# Patient Record
Sex: MALE | Race: WHITE | NOT HISPANIC OR LATINO | Employment: OTHER | URBAN - METROPOLITAN AREA
[De-identification: names, ages, dates, MRNs, and addresses within clinical notes are randomized per-mention and may not be internally consistent; named-entity substitution may affect disease eponyms.]

---

## 2017-09-27 ENCOUNTER — HOSPITAL ENCOUNTER (OUTPATIENT)
Facility: MEDICAL CENTER | Age: 62
End: 2017-09-27
Payer: COMMERCIAL

## 2017-09-27 ENCOUNTER — EH NON-PROVIDER (OUTPATIENT)
Dept: OCCUPATIONAL MEDICINE | Facility: CLINIC | Age: 62
End: 2017-09-27

## 2017-09-27 ENCOUNTER — HOSPITAL ENCOUNTER (OUTPATIENT)
Facility: MEDICAL CENTER | Age: 62
End: 2017-09-27
Attending: PREVENTIVE MEDICINE
Payer: COMMERCIAL

## 2017-09-27 DIAGNOSIS — Z02.89 ENCOUNTER FOR OCCUPATIONAL HEALTH EXAMINATION: ICD-10-CM

## 2017-09-27 LAB
BDY FAT % MEASURED: 23.9 %
BP DIAS: 68 MMHG
BP SYS: 138 MMHG
CHOLEST SERPL-MCNC: 201 MG/DL (ref 100–199)
DIABETES HTDIA: NO
EVENT NAME HTEVT: NORMAL
FASTING HTFAS: YES
GLUCOSE SERPL-MCNC: 105 MG/DL (ref 65–99)
HDLC SERPL-MCNC: 37 MG/DL
HYPERTENSION HTHYP: NO
LDLC SERPL CALC-MCNC: 128 MG/DL
SCREENING LOC CITY HTCIT: NORMAL
SCREENING LOC STATE HTSTA: NORMAL
SCREENING LOCATION HTLOC: NORMAL
SMOKING HTSMO: NO
SUBSCRIBER ID HTSID: NORMAL
TRIGL SERPL-MCNC: 179 MG/DL (ref 0–149)

## 2017-09-27 PROCEDURE — 86480 TB TEST CELL IMMUN MEASURE: CPT | Performed by: PREVENTIVE MEDICINE

## 2017-09-27 PROCEDURE — S5190 WELLNESS ASSESSMENT BY NONPH: HCPCS

## 2017-09-27 PROCEDURE — 80061 LIPID PANEL: CPT

## 2017-09-27 PROCEDURE — 82947 ASSAY GLUCOSE BLOOD QUANT: CPT

## 2017-09-29 LAB
M TB TUBERC IFN-G BLD QL: NEGATIVE
M TB TUBERC IFN-G/MITOGEN IGNF BLD: -0
M TB TUBERC IGNF/MITOGEN IGNF CONTROL: 62.25 [IU]/ML
MITOGEN IGNF BCKGRD COR BLD-ACNC: 0.03 [IU]/ML

## 2017-10-10 ENCOUNTER — IMMUNIZATION (OUTPATIENT)
Dept: OCCUPATIONAL MEDICINE | Facility: CLINIC | Age: 62
End: 2017-10-10

## 2017-10-10 DIAGNOSIS — Z23 NEED FOR VACCINATION: ICD-10-CM

## 2017-10-10 PROCEDURE — 90686 IIV4 VACC NO PRSV 0.5 ML IM: CPT | Performed by: PREVENTIVE MEDICINE

## 2018-01-18 ENCOUNTER — EH NON-PROVIDER (OUTPATIENT)
Dept: OCCUPATIONAL MEDICINE | Facility: CLINIC | Age: 63
End: 2018-01-18

## 2018-03-06 ENCOUNTER — HOSPITAL ENCOUNTER (OUTPATIENT)
Dept: LAB | Facility: MEDICAL CENTER | Age: 63
End: 2018-03-06
Attending: FAMILY MEDICINE
Payer: COMMERCIAL

## 2018-03-06 ENCOUNTER — HOSPITAL ENCOUNTER (OUTPATIENT)
Dept: RADIOLOGY | Facility: MEDICAL CENTER | Age: 63
End: 2018-03-06
Attending: FAMILY MEDICINE
Payer: COMMERCIAL

## 2018-03-06 DIAGNOSIS — G43.119 CLASSICAL MIGRAINE WITH INTRACTABLE MIGRAINE: ICD-10-CM

## 2018-03-06 LAB
BUN SERPL-MCNC: 21 MG/DL (ref 8–22)
CREAT SERPL-MCNC: 1.35 MG/DL (ref 0.5–1.4)

## 2018-03-06 PROCEDURE — A9577 INJ MULTIHANCE: HCPCS | Performed by: FAMILY MEDICINE

## 2018-03-06 PROCEDURE — 700117 HCHG RX CONTRAST REV CODE 255: Performed by: FAMILY MEDICINE

## 2018-03-06 PROCEDURE — 70553 MRI BRAIN STEM W/O & W/DYE: CPT

## 2018-03-06 PROCEDURE — 36415 COLL VENOUS BLD VENIPUNCTURE: CPT

## 2018-03-06 PROCEDURE — 84520 ASSAY OF UREA NITROGEN: CPT

## 2018-03-06 PROCEDURE — 82565 ASSAY OF CREATININE: CPT

## 2018-03-06 RX ADMIN — GADOBENATE DIMEGLUMINE 10 ML: 529 INJECTION, SOLUTION INTRAVENOUS at 18:01

## 2018-06-27 ENCOUNTER — APPOINTMENT (OUTPATIENT)
Dept: URGENT CARE | Facility: CLINIC | Age: 63
End: 2018-06-27

## 2018-06-27 ENCOUNTER — APPOINTMENT (OUTPATIENT)
Dept: RADIOLOGY | Facility: IMAGING CENTER | Age: 63
End: 2018-06-27
Attending: NURSE PRACTITIONER
Payer: COMMERCIAL

## 2018-06-27 ENCOUNTER — OCCUPATIONAL MEDICINE (OUTPATIENT)
Dept: URGENT CARE | Facility: CLINIC | Age: 63
End: 2018-06-27
Payer: COMMERCIAL

## 2018-06-27 VITALS
HEIGHT: 72 IN | WEIGHT: 217 LBS | BODY MASS INDEX: 29.39 KG/M2 | OXYGEN SATURATION: 98 % | DIASTOLIC BLOOD PRESSURE: 62 MMHG | HEART RATE: 77 BPM | SYSTOLIC BLOOD PRESSURE: 142 MMHG | TEMPERATURE: 98.3 F

## 2018-06-27 DIAGNOSIS — S86.911A KNEE STRAIN, RIGHT, INITIAL ENCOUNTER: ICD-10-CM

## 2018-06-27 DIAGNOSIS — M25.461 EFFUSION OF RIGHT KNEE: ICD-10-CM

## 2018-06-27 PROCEDURE — 73562 X-RAY EXAM OF KNEE 3: CPT | Mod: 26,RT,29 | Performed by: NURSE PRACTITIONER

## 2018-06-27 PROCEDURE — 99213 OFFICE O/P EST LOW 20 MIN: CPT | Mod: 29 | Performed by: NURSE PRACTITIONER

## 2018-06-27 ASSESSMENT — ENCOUNTER SYMPTOMS
JOINT SWELLING: 1
SHORTNESS OF BREATH: 0
ARTHRALGIAS: 0
CHILLS: 0
DIZZINESS: 0
EYE PAIN: 0
FEVER: 0
NAUSEA: 0
VOMITING: 0
MYALGIAS: 0
WEAKNESS: 0
SORE THROAT: 0
NUMBNESS: 0

## 2018-06-27 NOTE — LETTER
[place] [id number] [facility] [address] [place] [address]  Phone:  [phone] - Fax:  [phone]   [facility] Progress Report and Disability Certification  Date of Service: 6/27/2018   No Show:  No  Date / Time of Next Visit: 7/2/2018 @ 5pm   Claim Information   Patient Name: Jimmy Hirsch  Claim Number:     Employer: [employer]  Date of Injury: 6/26/2018     Insurer / TPA: Workers Choice  ID / SSN:     Occupation: Clinial Couner  Diagnosis: Diagnoses of Knee strain, right, initial encounter and Effusion of right knee were pertinent to this visit.    Medical Information   Related to Industrial Injury? Yes    Subjective Complaints:  DOI: 6/26/18. Patient presents to clinic with complaitns of right knee injury. He stepped out of his vehicle when he turned to walk he felt a pop of his right knee. He immideatly had pain and discomfort when he began walking. He also had notable swelling. He attempted to ice, rest, elevated, and aleve with minimal relief in symptoms. He is having 10/10 pain of the knee. Previously He sprained right knee in December playing basketball. He went to the [employer] [facility] and had a cortisone shot at that time with significant relief in symptoms. Since then all Pain was resolved as of yesterdays incident. He denies second job. Denies surgery.    Objective Findings:  Right knee with decreased range of motion, swelling, tenderness to the medial joint line. Gait antalgic.     Pre-Existing Condition(s):     Assessment:   Initial Visit    Status: Additional Care Required  Permanent Disability:No    Plan:      Diagnostics: X-ray    Comments:  No acute fracture or dislocation.  Mild tricompartmental joint osteoarthritis.  Degenerative change of the medial meniscus with calcifications.  Moderate knee joint effusion.    Disability Information   Status: Released to Full Duty    From:  6/27/2018  Through: 7/2/2018 Restrictions are:     Physical Restrictions    Sitting:    Standing:    Stooping:    Bending:      Squatting:    Walking:    Climbing:    Pushing:      Pulling:    Other:    Reaching Above Shoulder (L):   Reaching Above Shoulder (R):       Reaching Below Shoulder (L):    Reaching Below Shoulder (R):      Not to exceed Weight Limits   Carrying(hrs):   Weight Limit(lb):   Lifting(hrs):   Weight  Limit(lb):     Comments: Advised Relative rest, ice, nsaid prn. Elevation and compression prn swelling. Patient provided a knee brace. Patient declined crutches as he has crutches at home.Patient declining temporary work restrictions will be released to full duty. Will have patient follow up with occupational health for further evaluation and management.      Repetitive Actions   Hands: i.e. Fine Manipulations from Grasping:     Feet: i.e. Operating Foot Controls:     Driving / Operate Machinery:     Physician Name: NICA Scott Physician Signature: ADDIE Rizo e-Signature: Dr. Jarrod Jordan, Medical Director   Clinic Name / Location: [address] [address] Clinic Phone Number: Dept: [phone]   Appointment Time: 1:45 Pm Visit Start Time: 2:00 PM   Check-In Time:  1:45 Pm Visit Discharge Time:  3:05pm   Original-Treating Physician or Chiropractor    Page 2-Insurer/TPA    Page 3-Employer    Page 4-Employee

## 2018-06-27 NOTE — PROGRESS NOTES
Subjective:     Jimmy Hirsch is a 63 y.o. male who presents for Knee Injury (yesterday Right knee tweeked and hurt pop )    DOI: 6/26/18. Patient presents to clinic with complaitns of right knee injury. He stepped out of his vehicle when he turned to walk he felt a pop of his right knee. He immideatly had pain and discomfort when he began walking. He also had notable swelling. He attempted to ice, rest, elevated, and aleve with minimal relief in symptoms. He is having 10/10 pain of the knee. Previously He sprained right knee in December playing basketball. He went to the [facility] and had a cortisone shot at that time with significant relief in symptoms. Since then all Pain was resolved as of yesterdays incident. He denies second job. Denies surgery.   Knee Injury   This is a new problem. The current episode started yesterday. The problem occurs constantly. The problem has been unchanged. Associated symptoms include joint swelling (right knee). Pertinent negatives include no arthralgias, chest pain, chills, fever, myalgias, nausea, numbness, rash, sore throat, vomiting or weakness. The symptoms are aggravated by walking. He has tried NSAIDs, ice and rest for the symptoms. The treatment provided no relief.     Past Medical History:   Diagnosis Date   • Unspecified disorder of thyroid      Past Surgical History:   Procedure Laterality Date   • KNEE ARTHROSCOPY  5/8/2012    Performed by JACQUELINE MOSER at [facility] ORS   • MEDIAL MENISCECTOMY  5/8/2012    Performed by JACQUELINE MOSER at [facility] OR[facility]   • CONDYLOMA ABLATION LASER  2/18/2010    Performed by RAUL HINES at SUR[facility] ORS   • WRIST ORIF  6/1984   • APPENDECTOMY  6/1978   • OTHER       Social History     Social History   • Marital status:      Spouse name: N/A   • Number of children: N/A   • Years of education: N/A     Occupational History   • Not on file.     Social History Main Topics   • Smoking status:  Former Smoker     Types: Cigars   • Smokeless tobacco: Never Used      Comment: 3-4 cigars per day   • Alcohol use No   • Drug use: No   • Sexual activity: Not on file     Other Topics Concern   • Not on file     Social History Narrative   • No narrative on file    History reviewed. No pertinent family history. Review of Systems   Constitutional: Negative for chills and fever.   HENT: Negative for sore throat.    Eyes: Negative for pain.   Respiratory: Negative for shortness of breath.    Cardiovascular: Negative for chest pain.   Gastrointestinal: Negative for nausea and vomiting.   Genitourinary: Negative for hematuria.   Musculoskeletal: Positive for joint pain and joint swelling (right knee). Negative for arthralgias and myalgias.   Skin: Negative for rash.   Neurological: Negative for dizziness, weakness and numbness.     Allergies   Allergen Reactions   • Nkda [No Known Drug Allergy]       Objective:   /62   Pulse 77   Temp 36.8 °C (98.3 °F)   Ht 1.829 m (6')   Wt 98.4 kg (217 lb)   SpO2 98%   BMI 29.43 kg/m²   Physical Exam   Constitutional: He is oriented to person, place, and time. He appears well-developed and well-nourished. No distress.   HENT:   Head: Normocephalic and atraumatic.   Eyes: Conjunctivae and EOM are normal. Pupils are equal, round, and reactive to light.   Cardiovascular: Normal rate and regular rhythm.    No murmur heard.  Pulmonary/Chest: Effort normal and breath sounds normal. No respiratory distress.   Abdominal: Soft. He exhibits no distension. There is no tenderness.   Musculoskeletal:        Right knee: He exhibits decreased range of motion, swelling and bony tenderness. He exhibits no LCL laxity, normal patellar mobility, normal meniscus and no MCL laxity. Tenderness found. Medial joint line tenderness noted.        Legs:       Neurological: He is alert and oriented to person, place, and time. He has normal reflexes. No sensory deficit.   Skin: Skin is warm and dry.      Psychiatric: He has a normal mood and affect.        Right knee with decreased range of motion, swelling, tenderness to the medial joint line. Gait antalgic.  Assessment/Plan:   Assessment    1. Knee strain, right, initial encounter  2. Effusion of right knee  - DX-KNEE 3 VIEWS RIGHT; Future    Xray results  No acute fracture or dislocation.  Mild tricompartmental joint osteoarthritis.  Degenerative change of the medial meniscus with calcifications.  Moderate knee joint effusion.    Advised Relative rest, ice, nsaid prn. Elevation and compression prn swelling. Patient provided a knee brace. Patient declined crutches as he has crutches at home.Patient declining temporary work restrictions will be released to full duty. Will have patient follow up with occupational health for further evaluation and management of right knee strain.      Differential diagnosis, natural history, supportive care, and indications for immediate follow-up discussed.

## 2018-06-27 NOTE — LETTER
EMPLOYEE’S CLAIM FOR COMPENSATION/ REPORT OF INITIAL TREATMENT  FORM C-4    EMPLOYEE’S CLAIM - PROVIDE ALL INFORMATION REQUESTED   First Name  Jimmy Last Name  Torrey Birthdate                    1955                Sex  male Claim Number   Home Address  [address] Age  63 y.o. 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  Telephone  [phone]    Employer Address  [address]  [address]    Date of Injury  6/26/2018               Hour of Injury  2:45 PM Date Employer Notified  6/27/2018 Last Day of Work after Injury or Occupational Disease  6/26/2018 Supervisor to Whom Injury Reported  Lisafrank   Address or Location of Accident (if applicable)  [[address] [place] [address]]   What were you doing at the time of accident? (if applicable)  Getting out of company vehicle    How did this injury or occupational disease occur? (Be specific an answer in detail. Use additional sheet if necessary)  After getting out of vehicle, I turned to walk to  specimens and heard/ felt a pop in   my knee   If you believe that you have an occupational disease, when did you first have knowledge of the disability and it relationship to your employment?  na Witnesses to the Accident  na      Nature of Injury or Occupational Disease  Sprain  Part(s) of Body Injured or Affected  Knee (R), ,     I certify that the above is true and correct to the best of my knowledge and that I have provided this information in order to obtain the benefits of [facility]  Industrial Insurance and Occupational Diseases Acts [place] inclusive or Chapter 617 of [place]).  I hereby authorize any physician, chiropractor, surgeon, practitioner, or other person, any hospital, including [facility] or [facility], any medical service organization, any insurance company, or other institution or organization to release to each other, any medical or other information, including benefits paid or payable, pertinent to this injury or disease, except information relative to diagnosis, treatment and/or counseling for AIDS, psychological conditions, alcohol or controlled substances, for which I must give specific authorization.  A Photostat of this authorization shall be as valid as the original.     Date   Place   Employee’s Signature   THIS REPORT MUST BE COMPLETED AND MAILED WITHIN 3 WORKING DAYS OF TREATMENT   Place  [facility]  Name of Facility  [facility]   Date  6/27/2018 Diagnosis  (S86.911A) Knee strain, right, initial encounter  (M25.461) Effusion of right knee Is there evidence the injured employee was under the influence of alcohol and/or another controlled substance at the time of accident?   Hour  2:00 PM Description of Injury or Disease  Diagnoses of Knee strain, right, initial encounter and Effusion of right knee were pertinent to this visit. No   Treatment  Advised Relative rest, ice, nsaid prn. Elevation and compression prn swelling. Patient provided a knee brace. Patient declined crutches as he has crutches at home. Advised weight-bearing as tolerated. Patient declining temporary work restrictions will be released to full duty. Will have patient follow up with occupational health for further evaluation and management of right knee strain.  Have you advised the patient to remain off work five days or more? No   X-Ray Findings  Positive  Comments:Moderate knee joint effusion   If Yes   From Date  To Date      From information given by the  "employee, together with medical evidence, can you directly connect this injury or occupational disease as job incurred?    Comments:to be determined If No Full Duty  Yes Modified Duty      Is additional medical care by a physician indicated?  Yes If Modified Duty, Specify any Limitations / Restrictions      Do you know of any previous injury or disease contributing to this condition or occupational disease?                            Yes  Comments:Previous right knee sprain   Date  6/27/2018 Print Doctor’s Name NICA Scott I certify the employer’s copy of  this form was mailed on:   Address  [address] [place] [address] [address]     [facility] Zip  [address]    Provider’s Tax ID Number  [id number] Telephone  Dept: [phone]        marbin-ADDIE Chaudhry   e-Signature: Dr. Jarrod Jordan, Medical Director Degree  APRN        ORIGINAL-TREATING PHYSICIAN OR CHIROPRACTOR    PAGE 2-INSURER/TPA    PAGE 3-EMPLOYER    PAGE 4-EMPLOYEE             Form C-4 (rev10/07)              BRIEF DESCRIPTION OF RIGHTS AND BENEFITS  (Pursuant to [place]RS 616C.050)    Notice of Injury or Occupational Disease (Incident Report Form C-1): If an injury or occupational disease (OD) arises out of and in the  course of employment, you must provide written notice to your employer as soon as practicable, but no later than 7 days after the accident or  OD. Your employer shall maintain a sufficient supply of the required forms.    Claim for Compensation (Form C-4): If medical treatment is sought, the form C-4 is available at the place of initial treatment. A completed  \"Claim for Compensation\" (Form C-4) must be filed within 90 days after an accident or OD. The treating physician or chiropractor must,  within 3 working days after treatment, complete and mail to the employer, the employer's insurer and third-party , the Claim for  Compensation.    Medical Treatment: If you require medical treatment " for your on-the-job injury or OD, you may be required to select a physician or  chiropractor from a list provided by your workers’ compensation insurer, if it has contracted with an Organization for Managed Care (MCO) or  Preferred Provider Organization (PPO) or providers of health care. If your employer has not entered into a contract with an MCO or PPO, you  may select a physician or chiropractor from the Panel of Physicians and Chiropractors. Any medical costs related to your industrial injury or  OD will be paid by your insurer.    Temporary Total Disability (TTD): If your doctor has certified that you are unable to work for a period of at least 5 consecutive days, or 5  cumulative days in a 20-day period, or places restrictions on you that your employer does not accommodate, you may be entitled to TTD  compensation.    Temporary Partial Disability (TPD): If the wage you receive upon reemployment is less than the compensation for TTD to which you are  entitled, the insurer may be required to pay you TPD compensation to make up the difference. TPD can only be paid for a maximum of 24  months.    Permanent Partial Disability (PPD): When your medical condition is stable and there is an indication of a PPD as a result of your injury or  OD, within 30 days, your insurer must arrange for an evaluation by a rating physician or chiropractor to determine the degree of your PPD. The  amount of your PPD award depends on the date of injury, the results of the PPD evaluation and your age and wage.    Permanent Total Disability (PTD): If you are medically certified by a treating physician or chiropractor as permanently and totally disabled  and have been granted a PTD status by your insurer, you are entitled to receive monthly benefits not to exceed 66 2/3% of your average  monthly wage. The amount of your PTD payments is subject to reduction if you previously received a PPD award.    Vocational Rehabilitation Services: You  may be eligible for vocational rehabilitation services if you are unable to return to the job due to a  permanent physical impairment or permanent restrictions as a result of your injury or occupational disease.    Transportation and Per [employer] Reimbursement: You may be eligible for travel expenses and per cisco associated with medical treatment.    Reopening: You may be able to reopen your claim if your condition worsens after claim closure.    Appeal Process: If you disagree with a written determination issued by the insurer or the insurer does not respond to your request, you may  appeal to the Department of Administration, , by following the instructions contained in your determination letter. You must  appeal the determination within 70 days from the date of the determination letter at [address], [address], [place], [place]  [address], or [address], [place], [place] [address]. If you disagree with the  decision, you may appeal to the  Department of Administration, . You must file your appeal within 30 days from the date of the  decision  letter at [address], [address], [place], [place] [address], or [address], [address], [place], [place] [address]. If you  disagree with a decision of an , you may file a petition for judicial review with the District Court. You must do so within 30  days of the Appeal Officer’s decision. You may be represented by an  at your own expense or you may contact the [facility] for possible  representation.    [place]  for Injured Workers (NAIW): If you disagree with a  decision, you may request that NAIW represent you  without charge at an  Hearing. For information regarding denial of benefits, you may contact the [facility] at: [address], [address], [place] [address], [phone]) [phone], or [address], [place] [address], [phone]    To File a Complaint with the Division: If you wish to file a complaint with the  of the Division of Industrial Relations (DIR),  please contact the Workers’ Compensation Section, [address], [address], [place], [place] [address], telephone (442) [phone], or  [address], [address], [place], [place] [address], telephone (586) [phone].    For assistance with Workers’ Compensation Issues: you may contact the Office of the Governor Consumer Health Assistance, [address] [place] [address], [address], [place], [place] [address], Toll Free 1-716.421.6426, Web site: http://govcha.[facility].nv.us, E-mail  [email or web address].                                                                                                                                                                                                                                   __________________________________________________________________                                                                   _________________                Employee Name / Signature                                                                                                                                                       Date                                                                                                                                                                                                     D-2 (rev. 10/07)

## 2018-07-02 ENCOUNTER — OCCUPATIONAL MEDICINE (OUTPATIENT)
Dept: URGENT CARE | Facility: CLINIC | Age: 63
End: 2018-07-02
Payer: COMMERCIAL

## 2018-07-02 VITALS
BODY MASS INDEX: 29.39 KG/M2 | HEIGHT: 72 IN | RESPIRATION RATE: 16 BRPM | HEART RATE: 54 BPM | SYSTOLIC BLOOD PRESSURE: 138 MMHG | TEMPERATURE: 98.1 F | WEIGHT: 217 LBS | DIASTOLIC BLOOD PRESSURE: 70 MMHG

## 2018-07-02 DIAGNOSIS — S86.911A STRAIN OF RIGHT KNEE, INITIAL ENCOUNTER: ICD-10-CM

## 2018-07-02 PROCEDURE — 99213 OFFICE O/P EST LOW 20 MIN: CPT | Mod: 29 | Performed by: NURSE PRACTITIONER

## 2018-07-02 ASSESSMENT — ENCOUNTER SYMPTOMS
CONSTITUTIONAL NEGATIVE: 1
CARDIOVASCULAR NEGATIVE: 1
GASTROINTESTINAL NEGATIVE: 1
PSYCHIATRIC NEGATIVE: 1
RESPIRATORY NEGATIVE: 1
NEUROLOGICAL NEGATIVE: 1

## 2018-07-02 NOTE — LETTER
[facility]  [address] [facility] [place] [address]  Phone:  [phone] - Fax:  [phone]   [facility] Progress Report and Disability Certification  Date of Service: 7/2/2018   No Show:  No  Date / Time of Next Visit:  7/9/2018 @ 4:40 pm   Claim Information   Patient Name: Jimmy Hirsch  Claim Number:     Employer: [employer]  Date of Injury: 6/26/2018     Insurer / TPA: Workers Choice  ID / SSN:     Occupation: Clinial Couner  Diagnosis: The encounter diagnosis was Strain of right knee, initial encounter.    Medical Information   Related to Industrial Injury? Yes  Comments:Difficult to fully correlate    Subjective Complaints:  DOI: 6/26/18. Patient presents to clinic with complaitns of right knee injury. He stepped out of his vehicle when he turned to walk he felt a pop of his right knee. He immediately had pain and discomfort when he began walking. He also had notable swelling. Admits to previous sprain of right knee in December playing basketball. He went to the [facility] [facility] and had a cortisone shot at that time with significant relief in symptoms. Since then all pain was resolved as of recent incident. He denies second job. Denies surgery. This is his second visit and reports improvement. Pain is currently 0/10 but can increase to 3/10 with ambulation. Overall, reports approx 75%. He is on full duty and tolerating well. He has been using a brace PRN, icing, resting, and using Aleve PRN for pain relief.    Objective Findings: A/Ox4. NAD. Right knee with mild swelling to medial aspect. Knee is non tender, has FROM. Strength 5/5. Distal N/V intact.    Pre-Existing Condition(s):     Assessment:   Condition Improved    Status: Additional Care Required  Permanent Disability:No    Plan: Medication  Comments:OTC NSAIDS, brace PRN, RICE, full duty, RTC in one week for re-eval     Diagnostics:      Comments:       Disability Information   Status: Released to  Full Duty    From:     Through:   Restrictions are:     Physical Restrictions   Sitting:    Standing:    Stooping:    Bending:      Squatting:    Walking:    Climbing:    Pushing:      Pulling:    Other:    Reaching Above Shoulder (L):   Reaching Above Shoulder (R):       Reaching Below Shoulder (L):    Reaching Below Shoulder (R):      Not to exceed Weight Limits   Carrying(hrs):   Weight Limit(lb):   Lifting(hrs):   Weight  Limit(lb):     Comments:      Repetitive Actions   Hands: i.e. Fine Manipulations from Grasping:     Feet: i.e. Operating Foot Controls:     Driving / Operate Machinery:     Physician Name: NICA Feliciano Physician Signature: JAZZY Preciado e-Signature: Dr. Jarrod Jordan, Medical Director   Clinic Name / Location: [address] [address] Clinic Phone Number: Dept: [phone]   Appointment Time: 5:00 Pm Visit Start Time: 4:48 PM   Check-In Time:  4:33 Pm Visit Discharge Time:  5:35 pm    Original-Treating Physician or Chiropractor    Page 2-Insurer/TPA    Page 3-Employer    Page 4-Employee

## 2018-07-03 NOTE — PROGRESS NOTES
Subjective:      Jimmy Hirsch is a 63 y.o. male who presents with Other ((R) knee says he's doing better,)      HPI  DOI: 6/26/18. Patient presents to clinic with complaitns of right knee injury. He stepped out of his vehicle when he turned to walk he felt a pop of his right knee. He immediately had pain and discomfort when he began walking. He also had notable swelling. Admits to previous sprain of right knee in December playing basketball. He went to the [facility] and had a cortisone shot at that time with significant relief in symptoms. Since then all pain was resolved as of recent incident. He denies second job. Denies surgery. This is his second visit and reports improvement. Pain is currently 0/10 but can increase to 3/10 with ambulation. Overall, reports approx 75%. He is on full duty and tolerating well. He has been using a brace PRN, icing, resting, and using Aleve PRN for pain relief.     Past Medical History:   Diagnosis Date   • Unspecified disorder of thyroid      Past Surgical History:   Procedure Laterality Date   • KNEE ARTHROSCOPY  5/8/2012    Performed by JACQUELINE MOSER at [facility]   • MEDIAL MENISCECTOMY  5/8/2012    Performed by JACQUELINE MOSER at [facility]   • CONDYLOMA ABLATION LASER  2/18/2010    Performed by RAUL HINES at [facility]   • WRIST ORIF  6/1984   • APPENDECTOMY  6/1978   • OTHER       Current Outpatient Prescriptions on File Prior to Visit   Medication Sig Dispense Refill   • lovastatin (MEVACOR) 10 MG tablet Take 10 mg by mouth every day.     • aspirin EC (ECOTRIN) 81 MG TBEC Take 81 mg by mouth every day.     • VITAMIN D, CHOLECALCIFEROL, PO Take  by mouth every day.     • docosahexanoic acid (OMEGA 3 FA) 1000 MG CAPS Take 1,000 mg by mouth every day.     • Magnesium 400 MG CAPS Take  by mouth every day.     • Multiple Vitamin (MULTIVITAMIN PO) Take  by mouth.     • levothyroxine (LEVOXYL) 100 MCG TABS Take 100 mcg by mouth  every day.       No current facility-administered medications on file prior to visit.      Nkda [no known drug allergy]    Review of Systems   Constitutional: Negative.    HENT: Negative.    Respiratory: Negative.    Cardiovascular: Negative.    Gastrointestinal: Negative.    Musculoskeletal: Positive for joint pain.   Skin: Negative.    Neurological: Negative.    Endo/Heme/Allergies: Negative.    Psychiatric/Behavioral: Negative.           Objective:     /70   Pulse (!) 54   Temp 36.7 °C (98.1 °F)   Resp 16   Ht 1.829 m (6')   Wt 98.4 kg (217 lb)   BMI 29.43 kg/m²      Physical Exam   Constitutional: He is oriented to person, place, and time. Vital signs are normal. He appears well-developed and well-nourished. He is active. He does not have a sickly appearance. He does not appear ill. No distress.   HENT:   Head: Normocephalic and atraumatic.   Right Ear: External ear normal.   Left Ear: External ear normal.   Nose: Nose normal.   Mouth/Throat: Oropharynx is clear and moist.   Eyes: Conjunctivae are normal. Pupils are equal, round, and reactive to light. Right eye exhibits no discharge. Left eye exhibits no discharge.   Neck: Normal range of motion. Neck supple. No JVD present. No tracheal deviation present.   Cardiovascular: Normal rate, regular rhythm, normal heart sounds and intact distal pulses.    Pulmonary/Chest: Effort normal and breath sounds normal. No stridor.   Musculoskeletal: Normal range of motion. He exhibits no edema, tenderness or deformity.        Right knee: He exhibits swelling. He exhibits normal range of motion, no effusion, no ecchymosis, no deformity, no laceration, no erythema, normal alignment, no LCL laxity, normal patellar mobility, no bony tenderness, normal meniscus and no MCL laxity. No tenderness found.   Right knee with mild swelling to medial aspect. Knee is non tender, has FROM. Strength 5/5. Distal N/V intact.    Lymphadenopathy:     He has no cervical adenopathy.    Neurological: He is alert and oriented to person, place, and time. He has normal strength. No cranial nerve deficit or sensory deficit. He exhibits normal muscle tone. Coordination and gait normal. GCS eye subscore is 4. GCS verbal subscore is 5. GCS motor subscore is 6.   Skin: Skin is warm, dry and intact. Capillary refill takes less than 2 seconds. No abrasion, no bruising, no ecchymosis, no laceration and no rash noted. No erythema. No pallor.   Psychiatric: He has a normal mood and affect. His behavior is normal. Judgment and thought content normal.   Vitals reviewed.         Assessment/Plan:     1. Strain of right knee, initial encounter      OTC NSAIDS, brace PRN, RICE, full duty, RTC in one week for re-eval   Supportive care, differential diagnoses, and indications for immediate follow-up discussed with patient.   Pathogenesis of diagnosis discussed including typical length and natural progression.   Instructed to return to clinic or nearest emergency department sooner for any change in condition, further concerns, or worsening of symptoms.  Patient states understanding of the plan of care and discharge instructions.          ADIS Feliciano.

## 2018-07-09 ENCOUNTER — OCCUPATIONAL MEDICINE (OUTPATIENT)
Dept: OCCUPATIONAL MEDICINE | Facility: CLINIC | Age: 63
End: 2018-07-09
Payer: COMMERCIAL

## 2018-07-09 VITALS
OXYGEN SATURATION: 94 % | SYSTOLIC BLOOD PRESSURE: 148 MMHG | TEMPERATURE: 98.3 F | RESPIRATION RATE: 19 BRPM | DIASTOLIC BLOOD PRESSURE: 68 MMHG | HEART RATE: 71 BPM | WEIGHT: 223 LBS | HEIGHT: 72 IN | BODY MASS INDEX: 30.2 KG/M2

## 2018-07-09 DIAGNOSIS — S86.911D KNEE STRAIN, RIGHT, SUBSEQUENT ENCOUNTER: ICD-10-CM

## 2018-07-09 PROCEDURE — 99213 OFFICE O/P EST LOW 20 MIN: CPT | Performed by: PREVENTIVE MEDICINE

## 2018-07-09 ASSESSMENT — ENCOUNTER SYMPTOMS
SENSORY CHANGE: 0
FOCAL WEAKNESS: 0
TINGLING: 0

## 2018-07-09 NOTE — LETTER
[address],   [address] [address]  Phone:  [phone] - Fax:  [phone]   [facility] Progress Report and Disability Certification  Date of Service: 7/9/2018   No Show:  No  Date / Time of Next Visit: 7/18/2018@4:30pm   Claim Information   Patient Name: Jimmy Hirsch  Claim Number:     Employer: [employer]  Date of Injury: 6/26/2018     Insurer / TPA: Workers [employer]  ID / SSN:     Occupation: Clinial Couner  Diagnosis: The encounter diagnosis was Knee strain, right, subsequent encounter.    Medical Information   Related to Industrial Injury? Yes    Subjective Complaints:  DOI: 6/26/18: 62 yo male presents with right knee injury. He stepped out of his vehicle when he turned to walk he felt a pop of his right knee. Seen in [facility], advised NSAIDs.  Patient states that overall the knee pain is improved from initial injury but has not changed much since last visit.  He states is able to do his full duty job.  Pain is mostly in the medial knee.  Denies any clicking, catching or giving out.  He has been icing the knee for relief.  Pain is worse with prolonged walking.   Objective Findings: Right knee: No gross deformity.  Tenderness over the medial joint line.  Full range of motion.  Anterior/posterior drawer test negative.  Leonor's negative.  No laxity with varus or valgus stress.   Pre-Existing Condition(s):     Assessment:   Condition Same    Status:    Permanent Disability:No    Plan:      Diagnostics:      Comments:  Prescribe diclofenac gel  Continue to ice as needed  Continue full duty  Follow-up next week, if no significant improvement will consider further imaging    Disability Information   Status: Released to Full Duty    From:  7/9/2018  Through: 7/18/2018 Restrictions are:     Physical Restrictions   Sitting:    Standing:    Stooping:    Bending:      Squatting:    Walking:    Climbing:    Pushing:      Pulling:   Other:    Reaching Above Shoulder (L):   Reaching Above Shoulder (R):       Reaching Below Shoulder (L):    Reaching Below Shoulder (R):      Not to exceed Weight Limits   Carrying(hrs):   Weight Limit(lb):   Lifting(hrs):   Weight  Limit(lb):     Comments:      Repetitive Actions   Hands: i.e. Fine Manipulations from Grasping:     Feet: i.e. Operating Foot Controls:     Driving / Operate Machinery:     Physician Name: Hossein Gomez D.O. Physician Signature: HOSSEIN Ortega D.O. e-Signature: Dr. Jarrod Jordan, Medical Director   Clinic Name / Location: [address],   [address] [place] [address], [place] [address] Clinic Phone Number: Dept: [phone]   Appointment Time: 4:40 Pm Visit Start Time: 4:38 PM   Check-In Time:  4:31 Pm Visit Discharge Time:  4:58pm   Original-Treating Physician or Chiropractor    Page 2-Insurer/TPA    Page 3-Employer    Page 4-Employee

## 2018-07-09 NOTE — PROGRESS NOTES
Subjective:      Jimmy Hirsch is a 63 y.o. male who presents with Follow-Up ([facility] DOI 6/26/18 rt knee, feeling the same -room pr1)      DOI: 6/26/18: 62 yo male presents with right knee injury. He stepped out of his vehicle when he turned to walk he felt a pop of his right knee. Seen in [facility], advised NSAIDs.  Patient states that overall the knee pain is improved from initial injury but has not changed much since last visit.  He states is able to do his full duty job.  Pain is mostly in the medial knee.  Denies any clicking, catching or giving out.  He has been icing the knee for relief.  Pain is worse with prolonged walking.     HPI    Review of Systems   Skin: Negative for itching and rash.   Neurological: Negative for tingling, sensory change and focal weakness.     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     Objective:     /68   Pulse 71   Temp 36.8 °C (98.3 °F)   Resp 19   Ht 1.829 m (6')   Wt 101.2 kg (223 lb)   SpO2 94%   BMI 30.24 kg/m²      Physical Exam   Constitutional: He is oriented to person, place, and time. He appears well-developed and well-nourished.   Cardiovascular: Normal rate.    Pulmonary/Chest: Effort normal.   Neurological: He is alert and oriented to person, place, and time.   Skin: Skin is warm and dry.   Psychiatric: He has a normal mood and affect. Judgment normal.       Right knee: No gross deformity.  Tenderness over the medial joint line.  Full range of motion.  Anterior/posterior drawer test negative.  Leonor's negative.  No laxity with varus or valgus stress.       Assessment/Plan:     1. Knee strain, right, subsequent encounter  - Diclofenac Sodium 1 % Gel; Apply 1 Application to skin as directed 3 times a day as needed.  Dispense: 1 Tube; Refill: 0    Prescribe diclofenac gel  Continue to ice as needed  Continue full duty  Follow-up next week, if no significant improvement will consider further  imaging    Patient's body mass index is 30.24 kg/m². Exercise and nutrition counseling were performed at this visit.

## 2018-07-18 ENCOUNTER — OCCUPATIONAL MEDICINE (OUTPATIENT)
Dept: OCCUPATIONAL MEDICINE | Facility: CLINIC | Age: 63
End: 2018-07-18
Payer: COMMERCIAL

## 2018-07-18 VITALS
OXYGEN SATURATION: 98 % | BODY MASS INDEX: 29.8 KG/M2 | RESPIRATION RATE: 16 BRPM | TEMPERATURE: 97.7 F | DIASTOLIC BLOOD PRESSURE: 78 MMHG | WEIGHT: 220 LBS | HEIGHT: 72 IN | SYSTOLIC BLOOD PRESSURE: 142 MMHG | HEART RATE: 61 BPM

## 2018-07-18 DIAGNOSIS — S86.911D KNEE STRAIN, RIGHT, SUBSEQUENT ENCOUNTER: ICD-10-CM

## 2018-07-18 PROCEDURE — 99212 OFFICE O/P EST SF 10 MIN: CPT | Performed by: PREVENTIVE MEDICINE

## 2018-07-18 NOTE — LETTER
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    Medical Information   Related to Industrial Injury? Yes    Subjective Complaints:  DOI: 6/26/18: 64 yo male presents with right knee injury. He stepped out of his vehicle when he turned to walk he felt a pop of his right knee.  Overall symptoms about the same.  Pain continues to be mostly in the medial knee.  Pain is worse with prolonged use.   Objective Findings: Right knee: No gross deformity.  Tenderness over the medial joint line.  Full range of motion.  Anterior/posterior drawer test negative.     Pre-Existing Condition(s):     Assessment:   Condition Same    Status: Additional Care Required  Permanent Disability:No    Plan:      Diagnostics:      Comments:  Continue diclofenac gel  Referral for MRI right knee  Referral for physical therapy  Continue full duty  Follow-up 2 weeks    Disability Information   Status: Released to Full Duty    From:  7/18/2018  Through: 8/1/2018 Restrictions are:     Physical Restrictions   Sitting:    Standing:    Stooping:    Bending:      Squatting:    Walking:    Climbing:    Pushing:      Pulling:    Other:    Reaching Above Shoulder (L):   Reaching Above Shoulder (R):       Reaching Below Shoulder (L):    Reaching Below Shoulder (R):      Not to exceed Weight Limits   Carrying(hrs):   Weight Limit(lb):   Lifting(hrs):   Weight  Limit(lb):     Comments:      Repetitive Actions   Hands: i.e. Fine  Manipulations from Grasping:     Feet: i.e. Operating Foot Controls:     Driving / Operate Machinery:     Physician Name: Hossein Gomez D.O. Physician Signature: HOSSEIN Ortega D.O. e-Signature: Dr. Jarrod Jordan, Medical Director   Clinic Name / Location: [address],   [address]  [place] [address] Clinic Phone Number: Dept: [phone]   Appointment Time: 4:30 Pm Visit Start Time: 4:29 PM   Check-In Time:  4:27 Pm Visit Discharge Time:  4:55pm   Original-Treating Physician or Chiropractor    Page 2-Insurer/TPA    Page 3-Employer    Page 4-Employee

## 2018-07-18 NOTE — PROGRESS NOTES
"Subjective:      Jimmy Hirsch is a 63 y.o. male who presents with Follow-Up ([facility] DOI 6/28/18 rt knee, feeling worse, room 2)      DOI: 6/26/18: 64 yo male presents with right knee injury. He stepped out of his vehicle when he turned to walk he felt a pop of his right knee.  Overall symptoms about the same.  Pain continues to be mostly in the medial knee.  Pain is worse with prolonged use.     HPI    ROS       Objective:     /78   Pulse 61   Temp 36.5 °C (97.7 °F)   Resp 16   Ht 1.829 m (6' 0.01\")   Wt 99.8 kg (220 lb)   SpO2 98%   BMI 29.83 kg/m²      Physical Exam    Right knee: No gross deformity.  Tenderness over the medial joint line.  Full range of motion.  Anterior/posterior drawer test negative.         Assessment/Plan:     1. Knee strain, right, subsequent encounter  - REFERRAL TO RADIOLOGY  - MR-KNEE-W/O RIGHT; Future  - REFERRAL TO PHYSICAL THERAPY Reason for Therapy: Eval/Treat/Report    Continue diclofenac gel  Referral for MRI right knee  Referral for physical therapy  Continue full duty  Follow-up 2 weeks  "

## 2018-07-26 ENCOUNTER — HOSPITAL ENCOUNTER (OUTPATIENT)
Dept: RADIOLOGY | Facility: MEDICAL CENTER | Age: 63
End: 2018-07-26
Attending: PREVENTIVE MEDICINE
Payer: COMMERCIAL

## 2018-07-26 DIAGNOSIS — S86.911D KNEE STRAIN, RIGHT, SUBSEQUENT ENCOUNTER: ICD-10-CM

## 2018-07-26 PROCEDURE — 73721 MRI JNT OF LWR EXTRE W/O DYE: CPT | Mod: RT

## 2018-08-01 ENCOUNTER — OCCUPATIONAL MEDICINE (OUTPATIENT)
Dept: OCCUPATIONAL MEDICINE | Facility: CLINIC | Age: 63
End: 2018-08-01
Payer: COMMERCIAL

## 2018-08-01 VITALS
BODY MASS INDEX: 29.8 KG/M2 | DIASTOLIC BLOOD PRESSURE: 58 MMHG | SYSTOLIC BLOOD PRESSURE: 138 MMHG | RESPIRATION RATE: 16 BRPM | TEMPERATURE: 97.1 F | HEIGHT: 72 IN | HEART RATE: 65 BPM | WEIGHT: 220 LBS | OXYGEN SATURATION: 94 %

## 2018-08-01 DIAGNOSIS — S86.911D KNEE STRAIN, RIGHT, SUBSEQUENT ENCOUNTER: ICD-10-CM

## 2018-08-01 DIAGNOSIS — S83.231D COMPLEX TEAR OF MEDIAL MENISCUS OF RIGHT KNEE AS CURRENT INJURY, SUBSEQUENT ENCOUNTER: ICD-10-CM

## 2018-08-01 PROCEDURE — 99213 OFFICE O/P EST LOW 20 MIN: CPT | Performed by: PREVENTIVE MEDICINE

## 2018-08-01 NOTE — LETTER
[facility]  [address] [place] [address],   [address] [address]  Phone:  [phone] - Fax:  [phone]   [facility] Progress Report and Disability Certification  Date of Service: 8/1/2018   No Show:  No  Date / Time of Next Visit: 8/22/2018 @ 4:00 PM    Claim Information   Patient Name: Jimmy Hirsch  Claim Number:     Employer: [employer]  Date of Injury: 6/26/2018     Insurer / TPA: Workers Choice  ID / SSN:     Occupation: Clinial [employer]  Diagnosis: Diagnoses of Knee strain, right, subsequent encounter and Complex tear of medial meniscus of right knee as current injury, subsequent encounter were pertinent to this visit.    Medical Information   Related to Industrial Injury? Yes    Subjective Complaints:  DOI: 6/26/18: 62 yo male presents with right knee injury. He stepped out of his vehicle when he turned to walk he felt a pop of his right knee.  Patient states overall right knee pain is about the same.  Pain continues to be mostly on the medial knee.  Pain with prolonged walking or squatting. Diclofenac gel only helped a little.   Objective Findings: Right knee: No gross deformity.  Tenderness over the medial joint line.  Full range of motion.  Slight antalgic gait   Pre-Existing Condition(s):     Assessment:   Condition Same    Status: Additional Care Required  Permanent Disability:No    Plan:      Diagnostics:      Comments:  MRI Right Knee: Complex medial meniscus tears with flipped fragment above the posterior root, body extrusion. Moderate medial femorotibial cartilage fissuring and thinning. Mucoid degeneration of the anterior cruciate ligament. Large knee joint effus  ion  Referral to orthopedics  Wait to schedule physical therapy until seen by orthopedics  [place] for full duty  Follow-up 3 weeks if not seen by orthopedics    Disability Information   Status: Released to Full Duty    From:  8/1/2018  Through: 8/22/2018 Restrictions are: Temporary     Physical Restrictions   Sitting:    Standing:    Stooping:    Bending:      Squatting:    Walking:    Climbing:    Pushing:      Pulling:    Other:    Reaching Above Shoulder (L):   Reaching Above Shoulder (R):       Reaching Below Shoulder (L):    Reaching Below Shoulder (R):      Not to exceed Weight Limits   Carrying(hrs):   Weight Limit(lb):   Lifting(hrs):   Weight  Limit(lb):     Comments:      Repetitive Actions   Hands: i.e. Fine Manipulations from Grasping:     Feet: i.e. Operating Foot Controls:     Driving / Operate Machinery:     Physician Name: Hossein Gomez D.O. Physician Signature: HOSSEIN Ortega D.O. e-Signature: Dr. Jarrod Jordan, Medical Director   Clinic Name / Location: [address],   [address] [place] [address] Clinic Phone Number: Dept: [phone]   Appointment Time: 3:30 Pm Visit Start Time: 3:32 PM   Check-In Time:  3:29 Pm Visit Discharge Time:  3:46 PM    Original-Treating Physician or Chiropractor    Page 2-Insurer/TPA    Page 3-Employer    Page 4-Employee

## 2018-08-01 NOTE — PROGRESS NOTES
"Subjective:      Jimmy Hirsch is a 63 y.o. male who presents with Follow-Up (WC DOI 6/26/18 rt knee, feeling the same, room 1)      DOI: 6/26/18: 62 yo male presents with right knee injury. He stepped out of his vehicle when he turned to walk he felt a pop of his right knee.  Patient states overall right knee pain is about the same.  Pain continues to be mostly on the medial knee.  Pain with prolonged walking or squatting. Diclofenac gel only helped a little.     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     Objective:     /58   Pulse 65   Temp 36.2 °C (97.1 °F)   Resp 16   Ht 1.829 m (6' 0.01\")   Wt 99.8 kg (220 lb)   SpO2 94%   BMI 29.83 kg/m²      Physical Exam   Constitutional: He is oriented to person, place, and time. He appears well-developed and well-nourished.   Cardiovascular: Normal rate.    Pulmonary/Chest: Effort normal.   Neurological: He is alert and oriented to person, place, and time.   Skin: Skin is warm and dry.   Psychiatric: He has a normal mood and affect.       Right knee: No gross deformity.  Tenderness over the medial joint line.  Full range of motion.  Slight antalgic gait       Assessment/Plan:     1. Knee strain, right, subsequent encounter  - REFERRAL TO ORTHOPEDICS    2. Complex tear of medial meniscus of right knee as current injury, subsequent encounter  - REFERRAL TO ORTHOPEDICS    MRI Right Knee: Complex medial meniscus tears with flipped fragment above the posterior root, body extrusion. Moderate medial femorotibial cartilage fissuring and thinning. Mucoid degeneration of the anterior cruciate ligament. Large knee joint effusion  Referral to orthopedics  Wait to schedule physical therapy until seen by orthopedics  Okay for full duty  Follow-up 3 weeks if not seen by orthopedics  "

## 2018-08-23 ENCOUNTER — APPOINTMENT (OUTPATIENT)
Dept: PHYSICAL THERAPY | Facility: REHABILITATION | Age: 63
End: 2018-08-23
Payer: COMMERCIAL

## 2018-09-25 ENCOUNTER — HOSPITAL ENCOUNTER (OUTPATIENT)
Facility: MEDICAL CENTER | Age: 63
End: 2018-09-25
Payer: COMMERCIAL

## 2018-09-25 LAB
BDY FAT % MEASURED: 26.6 %
BP DIAS: 72 MMHG
BP SYS: 151 MMHG
CHOLEST SERPL-MCNC: 202 MG/DL (ref 100–199)
DIABETES HTDIA: NO
EVENT NAME HTEVT: NORMAL
FASTING HTFAS: YES
GLUCOSE SERPL-MCNC: 111 MG/DL (ref 65–99)
HDLC SERPL-MCNC: 37 MG/DL
HYPERTENSION HTHYP: NO
LDLC SERPL CALC-MCNC: 134 MG/DL
SCREENING LOC CITY HTCIT: NORMAL
SCREENING LOC STATE HTSTA: NORMAL
SCREENING LOCATION HTLOC: NORMAL
SMOKING HTSMO: NO
SUBSCRIBER ID HTSID: NORMAL
TRIGL SERPL-MCNC: 154 MG/DL (ref 0–149)

## 2018-09-25 PROCEDURE — S5190 WELLNESS ASSESSMENT BY NONPH: HCPCS

## 2018-09-25 PROCEDURE — 82947 ASSAY GLUCOSE BLOOD QUANT: CPT

## 2018-09-25 PROCEDURE — 80061 LIPID PANEL: CPT

## 2018-09-26 ENCOUNTER — IMMUNIZATION (OUTPATIENT)
Dept: OCCUPATIONAL MEDICINE | Facility: CLINIC | Age: 63
End: 2018-09-26

## 2018-09-26 DIAGNOSIS — Z23 NEED FOR VACCINATION: ICD-10-CM

## 2018-10-02 ENCOUNTER — APPOINTMENT (OUTPATIENT)
Dept: ADMISSIONS | Facility: MEDICAL CENTER | Age: 63
End: 2018-10-02
Attending: ORTHOPAEDIC SURGERY
Payer: COMMERCIAL

## 2018-10-02 DIAGNOSIS — Z01.810 PRE-OPERATIVE CARDIOVASCULAR EXAMINATION: ICD-10-CM

## 2018-10-02 LAB — EKG IMPRESSION: NORMAL

## 2018-10-02 RX ORDER — LEVOTHYROXINE SODIUM 0.12 MG/1
125 TABLET ORAL
COMMUNITY

## 2018-10-05 PROCEDURE — 90686 IIV4 VACC NO PRSV 0.5 ML IM: CPT | Performed by: PREVENTIVE MEDICINE

## 2018-10-11 ENCOUNTER — HOSPITAL ENCOUNTER (OUTPATIENT)
Facility: MEDICAL CENTER | Age: 63
End: 2018-10-11
Attending: ORTHOPAEDIC SURGERY | Admitting: ORTHOPAEDIC SURGERY
Payer: COMMERCIAL

## 2018-10-11 VITALS
HEIGHT: 72 IN | SYSTOLIC BLOOD PRESSURE: 138 MMHG | HEART RATE: 53 BPM | OXYGEN SATURATION: 94 % | DIASTOLIC BLOOD PRESSURE: 72 MMHG | RESPIRATION RATE: 12 BRPM | WEIGHT: 224.87 LBS | TEMPERATURE: 97 F | BODY MASS INDEX: 30.46 KG/M2

## 2018-10-11 DIAGNOSIS — G89.18 POST-OP PAIN: ICD-10-CM

## 2018-10-11 PROCEDURE — 700101 HCHG RX REV CODE 250

## 2018-10-11 PROCEDURE — 700111 HCHG RX REV CODE 636 W/ 250 OVERRIDE (IP)

## 2018-10-11 PROCEDURE — 160048 HCHG OR STATISTICAL LEVEL 1-5: Performed by: ORTHOPAEDIC SURGERY

## 2018-10-11 PROCEDURE — 160035 HCHG PACU - 1ST 60 MINS PHASE I: Performed by: ORTHOPAEDIC SURGERY

## 2018-10-11 PROCEDURE — 160002 HCHG RECOVERY MINUTES (STAT): Performed by: ORTHOPAEDIC SURGERY

## 2018-10-11 PROCEDURE — 160029 HCHG SURGERY MINUTES - 1ST 30 MINS LEVEL 4: Performed by: ORTHOPAEDIC SURGERY

## 2018-10-11 PROCEDURE — 501838 HCHG SUTURE GENERAL: Performed by: ORTHOPAEDIC SURGERY

## 2018-10-11 PROCEDURE — 160047 HCHG PACU  - EA ADDL 30 MINS PHASE II: Performed by: ORTHOPAEDIC SURGERY

## 2018-10-11 PROCEDURE — 160025 RECOVERY II MINUTES (STATS): Performed by: ORTHOPAEDIC SURGERY

## 2018-10-11 PROCEDURE — 160041 HCHG SURGERY MINUTES - EA ADDL 1 MIN LEVEL 4: Performed by: ORTHOPAEDIC SURGERY

## 2018-10-11 PROCEDURE — 160046 HCHG PACU - 1ST 60 MINS PHASE II: Performed by: ORTHOPAEDIC SURGERY

## 2018-10-11 PROCEDURE — 160009 HCHG ANES TIME/MIN: Performed by: ORTHOPAEDIC SURGERY

## 2018-10-11 PROCEDURE — 502580 HCHG PACK, KNEE ARTHROSCOPY: Performed by: ORTHOPAEDIC SURGERY

## 2018-10-11 RX ORDER — BUPIVACAINE HYDROCHLORIDE AND EPINEPHRINE 2.5; 5 MG/ML; UG/ML
INJECTION, SOLUTION EPIDURAL; INFILTRATION; INTRACAUDAL; PERINEURAL
Status: DISCONTINUED | OUTPATIENT
Start: 2018-10-11 | End: 2018-10-11 | Stop reason: HOSPADM

## 2018-10-11 RX ORDER — SODIUM CHLORIDE, SODIUM LACTATE, POTASSIUM CHLORIDE, CALCIUM CHLORIDE 600; 310; 30; 20 MG/100ML; MG/100ML; MG/100ML; MG/100ML
INJECTION, SOLUTION INTRAVENOUS CONTINUOUS
Status: DISCONTINUED | OUTPATIENT
Start: 2018-10-11 | End: 2018-10-11 | Stop reason: HOSPADM

## 2018-10-11 RX ORDER — ONDANSETRON 2 MG/ML
4 INJECTION INTRAMUSCULAR; INTRAVENOUS
Status: DISCONTINUED | OUTPATIENT
Start: 2018-10-11 | End: 2018-10-11 | Stop reason: HOSPADM

## 2018-10-11 RX ORDER — HYDROMORPHONE HYDROCHLORIDE 2 MG/ML
0.2 INJECTION, SOLUTION INTRAMUSCULAR; INTRAVENOUS; SUBCUTANEOUS
Status: DISCONTINUED | OUTPATIENT
Start: 2018-10-11 | End: 2018-10-11 | Stop reason: HOSPADM

## 2018-10-11 RX ORDER — HYDROCODONE BITARTRATE AND ACETAMINOPHEN 5; 325 MG/1; MG/1
1-2 TABLET ORAL EVERY 6 HOURS PRN
Qty: 30 TAB | Refills: 0 | Status: SHIPPED | OUTPATIENT
Start: 2018-10-11 | End: 2018-10-18

## 2018-10-11 RX ORDER — HYDROMORPHONE HYDROCHLORIDE 2 MG/ML
0.1 INJECTION, SOLUTION INTRAMUSCULAR; INTRAVENOUS; SUBCUTANEOUS
Status: DISCONTINUED | OUTPATIENT
Start: 2018-10-11 | End: 2018-10-11 | Stop reason: HOSPADM

## 2018-10-11 RX ORDER — HALOPERIDOL 5 MG/ML
1 INJECTION INTRAMUSCULAR
Status: DISCONTINUED | OUTPATIENT
Start: 2018-10-11 | End: 2018-10-11 | Stop reason: HOSPADM

## 2018-10-11 RX ORDER — HYDROMORPHONE HYDROCHLORIDE 2 MG/ML
0.4 INJECTION, SOLUTION INTRAMUSCULAR; INTRAVENOUS; SUBCUTANEOUS
Status: DISCONTINUED | OUTPATIENT
Start: 2018-10-11 | End: 2018-10-11 | Stop reason: HOSPADM

## 2018-10-11 RX ORDER — SODIUM CHLORIDE, SODIUM LACTATE, POTASSIUM CHLORIDE, CALCIUM CHLORIDE 600; 310; 30; 20 MG/100ML; MG/100ML; MG/100ML; MG/100ML
1000 INJECTION, SOLUTION INTRAVENOUS
Status: COMPLETED | OUTPATIENT
Start: 2018-10-11 | End: 2018-10-11

## 2018-10-11 RX ORDER — DIPHENHYDRAMINE HYDROCHLORIDE 50 MG/ML
12.5 INJECTION INTRAMUSCULAR; INTRAVENOUS
Status: DISCONTINUED | OUTPATIENT
Start: 2018-10-11 | End: 2018-10-11 | Stop reason: HOSPADM

## 2018-10-11 RX ADMIN — SODIUM CHLORIDE, SODIUM LACTATE, POTASSIUM CHLORIDE, CALCIUM CHLORIDE 1000 ML: 600; 310; 30; 20 INJECTION, SOLUTION INTRAVENOUS at 06:24

## 2018-10-11 ASSESSMENT — PAIN SCALES - GENERAL
PAINLEVEL_OUTOF10: 0
PAINLEVEL_OUTOF10: ASSUMED PAIN PRESENT
PAINLEVEL_OUTOF10: ASSUMED PAIN PRESENT
PAINLEVEL_OUTOF10: 0
PAINLEVEL_OUTOF10: 0

## 2018-10-11 NOTE — OR NURSING
0849 To PACU from OR via gurney, sleeping, respirations spontaneous and non-labored via LMA.Icepack applied over c/d/i left knee surgical dressings. +2 pedal pulse to RLE and cap refill < 3 seconds. VSS   0900 No change   0903 LMA dc'd at this time, VSS. Pt drowsy but denies pain and nausea at this time.    0915 Pt denies pain and nausea. Pt sleeping intermittently.  \  0930 Pt awake, alert and oriented at this time. Denies pain and nausea. Tolerating sips of water   0943 Pt meets criteria for stage two. VSS

## 2018-10-11 NOTE — OP REPORT
DATE OF SERVICE:  10/11/2018    PREOPERATIVE DIAGNOSIS:  Right knee medial meniscal tear.    POSTOPERATIVE DIAGNOSES:  1.  Right knee complex posterior horn medial meniscal tear.  2.  Radial tearing of the lateral meniscus.  3.  Grade II/III chondromalacia of the patella, medial femoral condyle, medial   tibial plateau.    PROCEDURES:  1.  Right knee diagnostic arthroscopy with arthroscopic partial, medial and   lateral meniscectomy.  2.  Right knee arthroscopic chondroplasty of the patella, medial femoral   condyle, medial tibial plateau.    SURGEON:  Dinesh Crowder MD    ASSISTANT:  Jackie Love PA-C    ANESTHESIA:  General.    ANESTHESIOLOGIST:  Kailash Glover MD    IMPLANTS:  None.    COMPLICATIONS:  None.    DISPOSITION:  Stable to postanesthesia care unit.    INDICATIONS:  The patient is a very pleasant gentleman with progressive knee   pain, which has been unresponsive to conservative management.  The risks,   benefits, alternatives, and limitations of surgical intervention were   discussed in detail.  He expressed understanding and desired to proceed.    DESCRIPTION OF PROCEDURE:  The patient and the correct operative extremity   were identified in the preoperative area.  The knee was marked.  He was   brought to the operating room and the correct operative extremity again   confirmed.  He was placed supine on the OR table where he underwent general   anesthesia without complication.  Examination under anesthesia showed full   range of motion, no instability.  Knee was prepped with alcohol and injected   with 30 mL of 1% lidocaine with epinephrine.  The knee was then prepped and   draped in the usual sterile fashion using ChloraPrep.  Diagnostic arthroscopy   was then performed, which showed some grade II chondromalacia of the patella.    The trochlea was intact.  The notch showed an intact ACL and PCL.  Medial   compartment showed a complex tear of the posterior horn of the medial meniscus   with  chondrocalcinosis of the knee.  He had grade II/III chondromalacia of   the medial femoral condyle and the medial tibial plateau.  The lateral   compartment showed largely intact articular cartilage, but again with   chondrocalcinosis and free edge tearing of the lateral meniscus.  Partial   lateral meniscectomy was performed with the arthroscopic shaver.  Partial   medial meniscectomy was then performed with a duckbill resector and the   arthroscopic shaver.  Chondroplasty performed of the medial femoral condyle,   the medial tibial plateau and the patella.  The gutters were checked for loose   bodies, none were identified.  A spinal needle was placed into the   suprapatellar pouch under direct vision.  The fluid removed from the knee.    The scope was withdrawn.  The portals closed with 3-0 nylon.  The knee   injected with 0.5% bupivacaine with epinephrine.  Sterile dressings were   applied.  The knee was loosely overwrapped with an Ace wrap.  The patient was   then allowed to awake from anesthesia, transferred to his hospital cart, and   taken to postanesthesia care unit in stable condition.  He tolerated the   procedure well.  There were no immediate complications.       ____________________________________     CHANDU BONDS MD RD / BENJAMÍN    DD:  10/11/2018 11:10:12  DT:  10/11/2018 11:26:04    D#:  [id number]  Job#:  [id number]

## 2018-10-11 NOTE — OR NURSING
0943: To stage ll. Up to chair and dressed w/ CNA assist. No pain or nausea.  1050: Home care instructions reviewed w pt and wife questions, concerns addressed. Meets criteria for discharge.

## 2018-10-11 NOTE — DISCHARGE INSTRUCTIONS
ACTIVITY: Rest and take it easy for the first 24 hours.  A responsible adult is recommended to remain with you during that time.  It is normal to feel sleepy.  We encourage you to not do anything that requires balance, judgment or coordination.    MILD FLU-LIKE SYMPTOMS ARE NORMAL. YOU MAY EXPERIENCE GENERALIZED MUSCLE ACHES, THROAT IRRITATION, HEADACHE AND/OR SOME NAUSEA.    FOR 24 HOURS DO NOT:  Drive, operate machinery or run household appliances.  Drink beer or alcoholic beverages.   Make important decisions or sign legal documents.    SPECIAL INSTRUCTIONS: May remove dressings Post op Day #2 and Shower with wound uncovered.  Apply bandaids after shower.  Do not soak or submerge incisions for two weeks. Ice and elevate extremity. Follow up 7-10 days. Weight bearing as tolerated . Patient has follow up appt.    DIET: To avoid nausea, slowly advance diet as tolerated, avoiding spicy or greasy foods for the first day.  Add more substantial food to your diet according to your physician's instructions.    INCREASE FLUIDS AND FIBER TO AVOID CONSTIPATION.        You should CALL YOUR PHYSICIAN if you develop:  Fever greater than 101 degrees F.  Pain not relieved by medication, or persistent nausea or vomiting.  Excessive bleeding (blood soaking through dressing) or unexpected drainage from the wound.  Extreme redness or swelling around the incision site, drainage of pus or foul smelling drainage.  Inability to urinate or empty your bladder within 8 hours.  Problems with breathing or chest pain.    You should call 911 if you develop problems with breathing or chest pain.  If you are unable to contact your doctor or surgical center, you should go to the nearest emergency room or urgent care center.  Physician's telephone #: [phone]    If any questions arise, call your doctor.  If your doctor is not available, please feel free to call the Surgical Center at [phone].  The Center is open Monday through Friday from  7AM to 7PM.  You can also call the HEALTH HOTLINE open 24 hours/day, 7 days/week and speak to a nurse at [phone], or toll free at [phone].    A registered nurse may call you a few days after your surgery to see how you are doing after your procedure.    MEDICATIONS: Resume taking daily medication.  Take prescribed pain medication with food.  If no medication is prescribed, you may take non-aspirin pain medication if needed.  PAIN MEDICATION CAN BE VERY CONSTIPATING.  Take a stool softener or laxative such as senokot, pericolace, or milk of magnesia if needed.    Prescription given for Preoperatively .  Last pain medication given at None .    If your physician has prescribed pain medication that includes Acetaminophen (Tylenol), do not take additional Acetaminophen (Tylenol) while taking the prescribed medication.    Depression / Suicide Risk    As you are discharged from this [facility] facility, it is important to learn how to keep safe from harming yourself.    Recognize the warning signs:  · Abrupt changes in personality, positive or negative- including increase in energy   · Giving away possessions  · Change in eating patterns- significant weight changes-  positive or negative  · Change in sleeping patterns- unable to sleep or sleeping all the time   · Unwillingness or inability to communicate  · Depression  · Unusual sadness, discouragement and loneliness  · Talk of wanting to die  · Neglect of personal appearance   · Rebelliousness- reckless behavior  · Withdrawal from people/activities they love  · Confusion- inability to concentrate     If you or a loved one observes any of these behaviors or has concerns about self-harm, here's what you can do:  · Talk about it- your feelings and reasons for harming yourself  · Remove any means that you might use to hurt yourself (examples: pills, rope, extension cords, firearm)  · Get professional help from the community (Mental Health, Substance Abuse,  psychological counseling)  · Do not be alone:Call your Safe Contact- someone whom you trust who will be there for you.  · Call your local CRISIS HOTLINE 035-0434 or 735-226-1853  · Call your local Children's Mobile Crisis Response Team [place] (225) 149-9240 or www.OurHistree  · Call the toll free National Suicide Prevention Hotlines   · National Suicide Prevention Lifeline 492-160-EVJA (6350)  · National Hope Line Network 800-SUICIDE (610-6053)

## 2018-11-13 ENCOUNTER — PHYSICAL THERAPY (OUTPATIENT)
Dept: PHYSICAL THERAPY | Facility: REHABILITATION | Age: 63
End: 2018-11-13
Attending: PHYSICIAN ASSISTANT
Payer: COMMERCIAL

## 2018-11-13 DIAGNOSIS — M25.562 CHRONIC PAIN OF LEFT KNEE: ICD-10-CM

## 2018-11-13 DIAGNOSIS — S83.241A OTHER TEAR OF MEDIAL MENISCUS, CURRENT INJURY, RIGHT KNEE, INITIAL ENCOUNTER: ICD-10-CM

## 2018-11-13 DIAGNOSIS — S86.912D STRAIN OF LEFT KNEE, SUBSEQUENT ENCOUNTER: ICD-10-CM

## 2018-11-13 DIAGNOSIS — G89.29 CHRONIC PAIN OF LEFT KNEE: ICD-10-CM

## 2018-11-13 PROCEDURE — 97014 ELECTRIC STIMULATION THERAPY: CPT

## 2018-11-13 PROCEDURE — 97140 MANUAL THERAPY 1/> REGIONS: CPT

## 2018-11-13 PROCEDURE — 97110 THERAPEUTIC EXERCISES: CPT

## 2018-11-13 PROCEDURE — 97161 PT EVAL LOW COMPLEX 20 MIN: CPT

## 2018-11-13 ASSESSMENT — ENCOUNTER SYMPTOMS
PAIN SCALE AT LOWEST: 0
PAIN SCALE: 0

## 2018-11-13 NOTE — OP THERAPY EVALUATION
Outpatient Physical Therapy  INITIAL EVALUATION    [facility] Physical Therapy [address]  [address]  [place] [address]  Phone:  [phone]  Fax:  [phone]    Date of Evaluation: 2018    Patient: Jimmy Hirsch  YOB: 1955  MRN: [id number]     Referring Provider: Jackie Love P.A.-C.  [address] [place] [address]   Referring Diagnosis Other tear of medial meniscus, current injury, right knee, initial encounter [S83.241A]     Time Calculation  Start time: 923  Stop time: 1020 Time Calculation (min): 57 minutes         Chief Complaint: No chief complaint on file.    Visit Diagnoses     ICD-10-CM   1. Other tear of medial meniscus, current injury, right knee, initial encounter S83.241A   2. Strain of left knee, subsequent encounter S86.912D   3. Chronic pain of left knee M25.562    G89.29         Subjective   History of Present Illness:     History of chief complaint:  R medial meniscectomy 10/11/18 with minimal pain since surgery.  Patient reports only limit is kneeling. 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     Pain:     Current pain ratin    At best pain ratin    Location:  Medial knee joint line region    Aggravating factors:  Kneeling,   Raking leaves and bending   Squat 85 degree increase pain and off-load to L        Past Medical History:   Diagnosis Date   • High cholesterol    • Unspecified disorder of thyroid      Past Surgical History:   Procedure Laterality Date   • KNEE ARTHROSCOPY Right 10/11/2018    Procedure: KNEE ARTHROSCOPY;  Surgeon: Dinesh Crowder M.D.;  Location: [facility];  Service: Orthopedics   • MEDIAL MENISCECTOMY Right 10/11/2018    Procedure: MEDIAL MENISCECTOMY - PARTIAL AND LUCIO partial lateral menisectomy;  Surgeon: Dinesh Crowder M.D.;  Location: [facility];  Service: Orthopedics   • KNEE  ARTHROSCOPY  5/8/2012    Performed by JACQUELINE MOSER at SURGERY [facility]   • MEDIAL MENISCECTOMY  5/8/2012    Performed by JACQUELINE MOSER at SURGERY [facility]   • CONDYLOMA ABLATION LASER  2/18/2010    Performed by RAUL HINES at SURGERY [facility]   • WRIST ORIF  6/1984   • APPENDECTOMY  6/1978   • TONSILLECTOMY  1966   • MYRINGOTOMY  1966   • OTHER         Precautions:       Objective   Observation and functional movement:  85 degree squats    Range of motion and strength:    0-129--4 deg. lag    Sensation and reflexes:     n/a            Therapeutic Treatments and Modalities:     Therapeutic Treatment and Modalities Summary: cfm medial joint line  mre hamstrings  A/p joint mobs    Post Chain Strengthening:    Ball in/out focus on hamstrings  Ball squat w/ focus on control  Mozambican tke vmo/gastroc 5/5 w/ mhp x 15'  //100 squat mw/o knee pain      Time-based treatments/modalities:  Manual therapy minutes (CPT 49964): 8 minutes  Therapeutic exercise minutes (CPT 76208): 15 minutes       Assessment, Response and Plan:   Assessment details:  3 weeks s/p meniscectomy with poor quad control and pain with TKE and squat > 80 deg--patient presents with weak post chain and poor medial lateral knee control. Patient should progress well with treatment if compliant with POC  Prognosis: good    Goals:   Short Term Goals:   Jog quickly across street  Shoot basketballs w/o pain  > 30 steps  Kneel to ground  WOMAC< 10%  Short term goal time span:  2-4 weeks      Long Term Goals:      Dribble  Basketball and jog w/o pain x 5'  > 50 steps  Kneel to ground w/o pain  WOMAC< 10%  Long term goal time span:  6-8 weeks    Plan:   Therapy options:  Physical therapy treatment to continue  Planned therapy interventions:  Therapeutic Exercise (CPT 65343), Therapeutic Activities (CPT 39110), Gait Training (CPT 80525), E Stim Unattended (CPT 23307) and Manual Therapy (CPT 92127)  Frequency:  2x week  Duration in weeks:   8  Duration in visits:  10  Plan details:  Post chain aistm cfm, jt mobs dynamic balance trg.      Functional Limitation G-Codes and Severity Modifiers  WOMAC Grand Total: 14.58   Current:     Goal:       Referring provider co-signature:  I have reviewed this plan of care and my co-signature certifies the need for services.    Physician Signature: ________________________________ Date: ______________

## 2018-11-16 ENCOUNTER — PHYSICAL THERAPY (OUTPATIENT)
Dept: PHYSICAL THERAPY | Facility: REHABILITATION | Age: 63
End: 2018-11-16
Attending: PHYSICIAN ASSISTANT
Payer: COMMERCIAL

## 2018-11-16 DIAGNOSIS — S83.241A OTHER TEAR OF MEDIAL MENISCUS, CURRENT INJURY, RIGHT KNEE, INITIAL ENCOUNTER: ICD-10-CM

## 2018-11-16 PROCEDURE — 97110 THERAPEUTIC EXERCISES: CPT

## 2018-11-16 PROCEDURE — 97014 ELECTRIC STIMULATION THERAPY: CPT

## 2018-11-16 NOTE — OP THERAPY DAILY TREATMENT
Outpatient Physical Therapy  DAILY TREATMENT     [facility] Physical [address].  [address]  [place] [address]  Phone:  [phone]  Fax:  [phone]    Date: 11/16/2018    Patient: Jimmy Hirsch  YOB: 1955  MRN: [id number]     Time Calculation  Start time: 0115  Stop time: 0220 Time Calculation (min): 65 minutes     Chief Complaint: No chief complaint on file.    Visit #: 1    SUBJECTIVE:  Step down on stairs and twisted yesterday with excruciating pain    OBJECTIVE:   SFMA: R hip IRand hip flex MD  R hip 90 deg ir 20 prom  10deg active rom          Therapeutic Treatments and Modalities:     Therapeutic Treatment and Modalities Summary: Rocking  Prone ir in hip extension  IASTM: post lateral gastyoc  Post Chain Strengthening:    Ball in/out focus on hamstrings with hip add and maintain l/s neutral  Ball bridges x 60  Ball bridges with alternating leg lifts x 5  Ball bridge hamstring curls x 5  Gait trg with t-plane motion--fitter to help isolate hip motion  Russian to vmo/ant tib/gastroc    Time-based treatments/modalities:  Manual therapy minutes (CPT 85017): 10 minutes  Therapeutic exercise minutes (CPT 40313): 25 minutes       Pain rating before treatment: 0  Pain rating after treatment: 0    ASSESSMENT:   Tight gastroc w/ limited R hip IR, decreased t-plane    PLAN/RECOMMENDATIONS:     t-plane gait trg, blet mobs to hip

## 2018-11-20 ENCOUNTER — PHYSICAL THERAPY (OUTPATIENT)
Dept: PHYSICAL THERAPY | Facility: REHABILITATION | Age: 63
End: 2018-11-20
Attending: PHYSICIAN ASSISTANT
Payer: COMMERCIAL

## 2018-11-20 DIAGNOSIS — S86.912D STRAIN OF LEFT KNEE, SUBSEQUENT ENCOUNTER: ICD-10-CM

## 2018-11-20 PROCEDURE — 97110 THERAPEUTIC EXERCISES: CPT

## 2018-11-21 NOTE — OP THERAPY DAILY TREATMENT
Outpatient Physical Therapy  DAILY TREATMENT     [facility] Physical [address].  [address]  [place] [address]  Phone:  [phone]  Fax:  [phone]    Date: 11/20/2018    Patient: Jimmy Hirsch  YOB: 1955  MRN: [id number]     Time Calculation  Start time: 0415  Stop time: 0445 Time Calculation (min): 30 minutes     Chief Complaint: No chief complaint on file.    Visit #: 2    SUBJECTIVE:  Still feeling knee pain from tweak last week but not as bad    OBJECTIVE:            Therapeutic Treatments and Modalities:     Therapeutic Treatment and Modalities Summary: CFM: medial jt line  Blue bosu  Black bosu w/ #4 wt for curls and shoulders  slsl clock balance  Rocking    Prone ir in hip extension  IASTM: post lateral gastyoc  Post Chain Strengthening:    Ball squats--avoid knees past toes  Gait trg with t-plane motion--reviewed      Time-based treatments/modalities:  Manual therapy minutes (CPT 04650): 5 minutes  Therapeutic exercise minutes (CPT 58350): 25 minutes       Pain rating before treatment: 0 tight --sore earlier but not at present  Pain rating after treatment: 0--squat deeper  ASSESSMENT:   Limited sls balance and strength--tolerated ex w/o pain  Severe TTP medial jt line  PLAN/RECOMMENDATION  Increase balance, squats, post chain

## 2018-11-27 ENCOUNTER — PHYSICAL THERAPY (OUTPATIENT)
Dept: PHYSICAL THERAPY | Facility: REHABILITATION | Age: 63
End: 2018-11-27
Attending: PHYSICIAN ASSISTANT
Payer: COMMERCIAL

## 2018-11-27 DIAGNOSIS — S86.912D STRAIN OF LEFT KNEE, SUBSEQUENT ENCOUNTER: ICD-10-CM

## 2018-11-27 PROCEDURE — 97110 THERAPEUTIC EXERCISES: CPT

## 2018-11-27 NOTE — OP THERAPY DAILY TREATMENT
Outpatient Physical Therapy  DAILY TREATMENT     [facility] Physical Therapy [address].  [address]  [place] [address]  Phone:  [phone]  Fax:  [phone]    Date: 11/27/2018    Patient: Jimmy Hirsch  YOB: 1955  MRN: [id number]     Time Calculation  Start time: 0817  Stop time: 0845 Time Calculation (min): 28 minutes     Chief Complaint: No chief complaint on file.    Visit #: 4    SUBJECTIVE:  Improving, only pain with inactivity--patient reports some pain with exercise but resolves as he continues  OBJECTIVE:            Therapeutic Treatments and Modalities:     Therapeutic Treatment and Modalities Summary:   Post Chain Strengthening:    Ball in/out focus on hamstrings  Ball bridges x 1'  Ball bridges with alternating leg lifts x 10  Ball bridge hamstring curls x5  Prone walkouts and prone alternating leg lifts x 5    Ball squats--avoid knees past toes  Gait trg with t-plane motion--reviewed      Time-based treatments/modalities:  Therapeutic exercise minutes (CPT 50054): 23 minutes       Pain rating before treatment: 0  Pain rating after treatment:   ASSESSMENT:   Improving strength w/o limit with stairs--noted fair- core strength and fair- posterior chain strength  PLAN/RECOMMENDATION  D/c in 30 days if no contact

## 2018-12-11 ENCOUNTER — APPOINTMENT (OUTPATIENT)
Dept: PHYSICAL THERAPY | Facility: REHABILITATION | Age: 63
End: 2018-12-11
Attending: PHYSICIAN ASSISTANT
Payer: COMMERCIAL

## 2018-12-13 ENCOUNTER — PHYSICAL THERAPY (OUTPATIENT)
Dept: PHYSICAL THERAPY | Facility: REHABILITATION | Age: 63
End: 2018-12-13
Attending: PHYSICIAN ASSISTANT
Payer: COMMERCIAL

## 2018-12-13 DIAGNOSIS — S86.912D STRAIN OF LEFT KNEE, SUBSEQUENT ENCOUNTER: ICD-10-CM

## 2018-12-13 PROCEDURE — 97110 THERAPEUTIC EXERCISES: CPT

## 2018-12-14 NOTE — OP THERAPY DAILY TREATMENT
Outpatient Physical Therapy  DAILY TREATMENT     [facility] Physical Therapy [address]  [address]  [place] [address]  Phone:  [phone]  Fax:  [phone]    Date: 12/13/2018    Patient: Jimmy Hirsch  YOB: 1955  MRN: [id number]     Time Calculation  Start time: 0420  Stop time: 0450 Time Calculation (min): 30 minutes     Chief Complaint: No chief complaint on file.    Visit #: 3    SUBJECTIVE:  Whole body ache, not feeling great--knee pain is unchanged--occasional pain with twisting--mvmt. L leg is tight in lateral calf past week    OBJECTIVE:            Therapeutic Treatments and Modalities:     Therapeutic Treatment and Modalities Summary: Reverse slide lunges bilaterly 30  Post Chain Strengthening:    Core stab level I w/ BFB:   --ball in/out   --marching  Reviewed body mechanics for lift/squat      Time-based treatments/modalities:  Therapeutic exercise minutes (CPT 15421): 25 minutes       Pain rating before treatment: 0  Pain rating after treatment:  Back feells better too  ASSESSMENT:   Focussed today on hip and back mechanics and implication for knee control with daily acitivyt  PLAN/RECOMMENDATION  F/u 1-2 visits

## 2018-12-20 ENCOUNTER — APPOINTMENT (OUTPATIENT)
Dept: PHYSICAL THERAPY | Facility: REHABILITATION | Age: 63
End: 2018-12-20
Attending: PHYSICIAN ASSISTANT
Payer: COMMERCIAL

## 2018-12-27 ENCOUNTER — PHYSICAL THERAPY (OUTPATIENT)
Dept: PHYSICAL THERAPY | Facility: REHABILITATION | Age: 63
End: 2018-12-27
Attending: PHYSICIAN ASSISTANT
Payer: COMMERCIAL

## 2018-12-27 DIAGNOSIS — S86.912D STRAIN OF LEFT KNEE, SUBSEQUENT ENCOUNTER: ICD-10-CM

## 2018-12-27 PROCEDURE — 97110 THERAPEUTIC EXERCISES: CPT

## 2018-12-27 NOTE — LETTER
December 28, 2018    Jackie Love P.A.-C.  [address] [place] [address]      Re:  Jimmy Hirsch          Dear Dr. Love,    It was a pleasure seeing your patient, Jimmy Hirsch, on 12/27/2018 for his final therapy visit.     Please find enclosed a copy of the patient's discharge summary from outpatient physical therapy services.          On behalf of the staff at [facility], we would like to thank you for your referrals.  We appreciate your confidence in our clinic and will continue to work hard to provide the best outcomes for your patients.    We sincerely enjoy treating your patients and hope that you have been happy with their care.  Thank you again, and please call with any questions, concerns or ways that we can best meet your needs.        Sincerely,          Aiden Valente, PT, DPT, OCS    No Recipients              Outpatient Physical Therapy  DISCHARGE SUMMARY NOTE      [address] [address]  Phone:  [phone]  Fax:  [phone]    Date of Visit: 12/27/2018    Patient: Jimmy Hirsch  YOB: 1955  MRN: [id number]     Referring Provider: Jackie Love P.A.-C.  [address] [place] [address]   Referring Diagnosis Other tear of medial meniscus, current injury, right knee, initial encounter [S83.241A]     Physical Therapy Occurrence Codes    Date physical therapy care plan established or reviewed:  8/23/18   Date physical therapy treatment started:  8/23/18          Functional Limitation G-Codes and Severity Modifiers  PT Functional Assessment Tool Used: LEFS  PT Functional Assessment Score: 76/80  Lower Extremity Functional Scale Total: 93.75   Goal:     Discharge:         Your patient is being discharged from Physical Therapy with the following comments:   · Goals met  SUBJECTIVE:  Patient reported that his Knee popped about a week ago and it  has felt great  since       ASSESSMENT:   Goals met- with patient denying limit with stairs or in/out of car. Patient is independent with his HEP.  Patient demonstrated hopping and jogging in clinic w/o pain  PLAN/RECOMMENDATION  D/c to an independent HEP      Aiden Valente, PT, DPT, OCS    Date: 12/28/2018

## 2018-12-28 NOTE — OP THERAPY DAILY TREATMENT
Outpatient Physical Therapy  DAILY TREATMENT     [facility] Physical [address].  [address]  [place] [address]  Phone:  [phone]  Fax:  [phone]    Date: 12/27/2018    Patient: Jimmy Hirsch  YOB: 1955  MRN: [id number]     Time Calculation  Start time: 0445  Stop time: 0515 Time Calculation (min): 30 minutes     Chief Complaint: No chief complaint on file.    Visit #: 4    SUBJECTIVE:  Knee popped about a week ago and knee has felt great    OBJECTIVE:  Full rom  8 bANDS SHUTTLE SLS LEG PRESS--NO PAIN  PT Functional Assessment Tool Used: LEFS  PT Functional Assessment Score: 76/80  Lower Extremity Functional Scale Total: 93.75       Therapeutic Treatments and Modalities:     Therapeutic Treatment and Modalities Summary: Reviewed HEP   Instructed in tri-planar stretches, active hamstrings stretch, jogging progression        Time-based treatments/modalities:  Therapeutic exercise minutes (CPT 60635): 23 minutes       Pain rating before treatment: 0  Pain rating after treatment:  0  ASSESSMENT:   Goals met--patient is independent with HEP  PLAN/RECOMMENDATION  D/c to an independent

## 2018-12-28 NOTE — OP THERAPY DISCHARGE SUMMARY
Outpatient Physical Therapy  DISCHARGE SUMMARY NOTE      [address]  [address]  [place] [address]  Phone:  [phone]  Fax:  [phone]    Date of Visit: 12/27/2018    Patient: Jimmy Hirsch  YOB: 1955  MRN: [id number]     Referring Provider: Jackie Love P.A.-C.  [address] [place] [address]   Referring Diagnosis Other tear of medial meniscus, current injury, right knee, initial encounter [S83.241A]     Physical Therapy Occurrence Codes    Date physical therapy care plan established or reviewed:  8/23/18   Date physical therapy treatment started:  8/23/18          Functional Limitation G-Codes and Severity Modifiers  PT Functional Assessment Tool Used: LEFS  PT Functional Assessment Score: 76/80  Lower Extremity Functional Scale Total: 93.75   Goal:     Discharge:         Your patient is being discharged from Physical Therapy with the following comments:   · Goals met  SUBJECTIVE:  Patient reported that his Knee popped about a week ago and it  has felt great since       ASSESSMENT:   Goals met- with patient denying limit with stairs or in/out of car. Patient is independent with his HEP.  Patient demonstrated hopping and jogging in clinic w/o pain  PLAN/RECOMMENDATION  D/c to an independent HEP      Aiden Valente, PT, DPT, OCS    Date: 12/28/2018

## 2020-01-09 ENCOUNTER — APPOINTMENT (OUTPATIENT)
Dept: RADIOLOGY | Facility: MEDICAL CENTER | Age: 65
End: 2020-01-09
Attending: EMERGENCY MEDICINE
Payer: COMMERCIAL

## 2020-01-09 ENCOUNTER — OFFICE VISIT (OUTPATIENT)
Dept: URGENT CARE | Facility: CLINIC | Age: 65
End: 2020-01-09
Payer: COMMERCIAL

## 2020-01-09 ENCOUNTER — HOSPITAL ENCOUNTER (EMERGENCY)
Facility: MEDICAL CENTER | Age: 65
End: 2020-01-09
Attending: EMERGENCY MEDICINE
Payer: COMMERCIAL

## 2020-01-09 VITALS
RESPIRATION RATE: 16 BRPM | HEART RATE: 68 BPM | SYSTOLIC BLOOD PRESSURE: 157 MMHG | HEIGHT: 72 IN | TEMPERATURE: 98.1 F | OXYGEN SATURATION: 97 % | DIASTOLIC BLOOD PRESSURE: 75 MMHG | BODY MASS INDEX: 29.26 KG/M2 | WEIGHT: 216.05 LBS

## 2020-01-09 VITALS
OXYGEN SATURATION: 96 % | SYSTOLIC BLOOD PRESSURE: 150 MMHG | WEIGHT: 218 LBS | RESPIRATION RATE: 14 BRPM | HEART RATE: 74 BPM | TEMPERATURE: 97.6 F | DIASTOLIC BLOOD PRESSURE: 84 MMHG | HEIGHT: 72 IN | BODY MASS INDEX: 29.53 KG/M2

## 2020-01-09 DIAGNOSIS — R42 VERTIGO: ICD-10-CM

## 2020-01-09 DIAGNOSIS — R94.31 ABNORMAL EKG: ICD-10-CM

## 2020-01-09 DIAGNOSIS — J32.0 CHRONIC MAXILLARY SINUSITIS: ICD-10-CM

## 2020-01-09 DIAGNOSIS — R42 DIZZINESS: ICD-10-CM

## 2020-01-09 LAB
ALBUMIN SERPL BCP-MCNC: 4.7 G/DL (ref 3.2–4.9)
ALBUMIN/GLOB SERPL: 1.7 G/DL
ALP SERPL-CCNC: 98 U/L (ref 30–99)
ALT SERPL-CCNC: 32 U/L (ref 2–50)
ANION GAP SERPL CALC-SCNC: 11 MMOL/L (ref 0–11.9)
AST SERPL-CCNC: 28 U/L (ref 12–45)
BASOPHILS # BLD AUTO: 0.4 % (ref 0–1.8)
BASOPHILS # BLD: 0.04 K/UL (ref 0–0.12)
BILIRUB SERPL-MCNC: 0.7 MG/DL (ref 0.1–1.5)
BUN SERPL-MCNC: 16 MG/DL (ref 8–22)
CALCIUM SERPL-MCNC: 9.7 MG/DL (ref 8.5–10.5)
CHLORIDE SERPL-SCNC: 103 MMOL/L (ref 96–112)
CO2 SERPL-SCNC: 23 MMOL/L (ref 20–33)
CREAT SERPL-MCNC: 0.94 MG/DL (ref 0.5–1.4)
EKG IMPRESSION: NORMAL
EOSINOPHIL # BLD AUTO: 0.01 K/UL (ref 0–0.51)
EOSINOPHIL NFR BLD: 0.1 % (ref 0–6.9)
ERYTHROCYTE [DISTWIDTH] IN BLOOD BY AUTOMATED COUNT: 39.6 FL (ref 35.9–50)
GLOBULIN SER CALC-MCNC: 2.7 G/DL (ref 1.9–3.5)
GLUCOSE SERPL-MCNC: 104 MG/DL (ref 65–99)
HCT VFR BLD AUTO: 48 % (ref 42–52)
HGB BLD-MCNC: 16.3 G/DL (ref 14–18)
IMM GRANULOCYTES # BLD AUTO: 0.03 K/UL (ref 0–0.11)
IMM GRANULOCYTES NFR BLD AUTO: 0.3 % (ref 0–0.9)
LIPASE SERPL-CCNC: 16 U/L (ref 11–82)
LYMPHOCYTES # BLD AUTO: 1.79 K/UL (ref 1–4.8)
LYMPHOCYTES NFR BLD: 18.9 % (ref 22–41)
MCH RBC QN AUTO: 29.1 PG (ref 27–33)
MCHC RBC AUTO-ENTMCNC: 34 G/DL (ref 33.7–35.3)
MCV RBC AUTO: 85.7 FL (ref 81.4–97.8)
MONOCYTES # BLD AUTO: 0.46 K/UL (ref 0–0.85)
MONOCYTES NFR BLD AUTO: 4.9 % (ref 0–13.4)
NEUTROPHILS # BLD AUTO: 7.13 K/UL (ref 1.82–7.42)
NEUTROPHILS NFR BLD: 75.4 % (ref 44–72)
NRBC # BLD AUTO: 0 K/UL
NRBC BLD-RTO: 0 /100 WBC
PLATELET # BLD AUTO: 415 K/UL (ref 164–446)
PMV BLD AUTO: 10.1 FL (ref 9–12.9)
POTASSIUM SERPL-SCNC: 3.9 MMOL/L (ref 3.6–5.5)
PROT SERPL-MCNC: 7.4 G/DL (ref 6–8.2)
RBC # BLD AUTO: 5.6 M/UL (ref 4.7–6.1)
SODIUM SERPL-SCNC: 137 MMOL/L (ref 135–145)
T4 FREE SERPL-MCNC: 1.29 NG/DL (ref 0.53–1.43)
TROPONIN T SERPL-MCNC: 6 NG/L (ref 6–19)
TSH SERPL DL<=0.005 MIU/L-ACNC: 0.27 UIU/ML (ref 0.38–5.33)
WBC # BLD AUTO: 9.5 K/UL (ref 4.8–10.8)

## 2020-01-09 PROCEDURE — 93000 ELECTROCARDIOGRAM COMPLETE: CPT | Performed by: PHYSICIAN ASSISTANT

## 2020-01-09 PROCEDURE — 93005 ELECTROCARDIOGRAM TRACING: CPT

## 2020-01-09 PROCEDURE — 85025 COMPLETE CBC W/AUTO DIFF WBC: CPT

## 2020-01-09 PROCEDURE — 83690 ASSAY OF LIPASE: CPT

## 2020-01-09 PROCEDURE — 71045 X-RAY EXAM CHEST 1 VIEW: CPT

## 2020-01-09 PROCEDURE — 80053 COMPREHEN METABOLIC PANEL: CPT

## 2020-01-09 PROCEDURE — 99213 OFFICE O/P EST LOW 20 MIN: CPT | Performed by: PHYSICIAN ASSISTANT

## 2020-01-09 PROCEDURE — 84439 ASSAY OF FREE THYROXINE: CPT

## 2020-01-09 PROCEDURE — 93005 ELECTROCARDIOGRAM TRACING: CPT | Performed by: EMERGENCY MEDICINE

## 2020-01-09 PROCEDURE — 84484 ASSAY OF TROPONIN QUANT: CPT

## 2020-01-09 PROCEDURE — 70450 CT HEAD/BRAIN W/O DYE: CPT

## 2020-01-09 PROCEDURE — 84443 ASSAY THYROID STIM HORMONE: CPT

## 2020-01-09 PROCEDURE — 99284 EMERGENCY DEPT VISIT MOD MDM: CPT

## 2020-01-09 RX ORDER — ONDANSETRON 4 MG/1
4 TABLET, ORALLY DISINTEGRATING ORAL EVERY 6 HOURS PRN
Qty: 10 TAB | Refills: 0 | Status: SHIPPED | OUTPATIENT
Start: 2020-01-09

## 2020-01-09 RX ORDER — MECLIZINE HYDROCHLORIDE 25 MG/1
25 TABLET ORAL 3 TIMES DAILY PRN
Qty: 30 TAB | Refills: 0 | Status: SHIPPED | OUTPATIENT
Start: 2020-01-09

## 2020-01-09 RX ORDER — FLUTICASONE PROPIONATE 50 MCG
2 SPRAY, SUSPENSION (ML) NASAL
Qty: 1 BOTTLE | Refills: 1 | Status: SHIPPED | OUTPATIENT
Start: 2020-01-09

## 2020-01-09 ASSESSMENT — ENCOUNTER SYMPTOMS
MYALGIAS: 0
BACK PAIN: 0
CHILLS: 1
ABDOMINAL PAIN: 0
VOMITING: 0
FEVER: 1
HEADACHES: 0
DIZZINESS: 1
NECK PAIN: 0
SORE THROAT: 0
BLURRED VISION: 0
MEMORY LOSS: 0
DIARRHEA: 0
NAUSEA: 1
SHORTNESS OF BREATH: 0
DOUBLE VISION: 0
PHOTOPHOBIA: 0
PALPITATIONS: 0
COUGH: 0
TINGLING: 0

## 2020-01-09 NOTE — PROGRESS NOTES
Subjective:      Jimmy Hirsch is a 64 y.o. male who presents with Dizziness (dizziness with nausea started this morning after waking up)            HPI  64-year-old male presents to urgent care with new problem of dizziness described as the room is spinning noted on awakening this morning around 7 AM.  Patient reports associated nausea and several episodes of vomiting.  He denies abdominal pain, fevers, diarrhea, chest pain, or shortness of breath.  Patient denies associated weakness, difficulty with speech/ambulation, visual acuity changes, or recent upper respiratory illness.   Patient denies history of coronary artery disease.  He does not smoke cigarettes.  Positive family history of early onset CAD.     Review of Systems   Constitutional: Positive for chills and fever. Negative for malaise/fatigue.   HENT: Negative for congestion, ear pain, hearing loss, sore throat and tinnitus.         Runny nose    Eyes: Negative for blurred vision, double vision and photophobia.   Respiratory: Negative for cough and shortness of breath.    Cardiovascular: Negative for chest pain and palpitations.   Gastrointestinal: Positive for nausea. Negative for abdominal pain, diarrhea and vomiting.   Genitourinary: Negative for dysuria, frequency and urgency.   Musculoskeletal: Negative for back pain, myalgias and neck pain.   Skin: Negative for rash.   Neurological: Positive for dizziness. Negative for tingling and headaches.   Endo/Heme/Allergies: Positive for environmental allergies.   Psychiatric/Behavioral: Negative for memory loss.       Past Medical History:   Diagnosis Date   • High cholesterol    • Unspecified disorder of thyroid      Current Outpatient Medications on File Prior to Visit   Medication Sig Dispense Refill   • levothyroxine (SYNTHROID) 125 MCG Tab Take 125 mcg by mouth Every morning on an empty stomach.     • lovastatin (MEVACOR) 10 MG tablet Take 10 mg by mouth every day.     • aspirin EC (ECOTRIN) 81 MG  TBEC Take 81 mg by mouth every day.     • docosahexanoic acid (OMEGA 3 FA) 1000 MG CAPS Take 1,000 mg by mouth every day.     • Magnesium 400 MG CAPS Take  by mouth every day.       No current facility-administered medications on file prior to visit.      Allergies   Allergen Reactions   • Nkda [No Known Drug Allergy]      Social History     Tobacco Use   • Smoking status: Former Smoker     Years: 15.00     Types: Cigars     Last attempt to quit: 2012     Years since quittin.0   • Smokeless tobacco: Never Used   • Tobacco comment: 3-4 cigars per day   Substance Use Topics   • Alcohol use: No      Objective:     /84 (BP Location: Left arm, Patient Position: Sitting, BP Cuff Size: Adult)   Pulse 74   Temp 36.4 °C (97.6 °F) (Temporal)   Resp 14   Ht 1.829 m (6')   Wt 98.9 kg (218 lb)   SpO2 96%   BMI 29.57 kg/m²      Physical Exam  Vitals signs reviewed.   Constitutional:       General: He is not in acute distress.     Appearance: Normal appearance. He is well-developed. He is not ill-appearing.   HENT:      Head: Normocephalic and atraumatic.      Right Ear: Tympanic membrane normal.      Left Ear: Tympanic membrane normal.      Nose: Nose normal.      Mouth/Throat:      Mouth: Mucous membranes are moist.      Pharynx: Oropharynx is clear. No posterior oropharyngeal erythema.   Eyes:      Extraocular Movements: Extraocular movements intact.      Conjunctiva/sclera: Conjunctivae normal.      Pupils: Pupils are equal, round, and reactive to light.   Neck:      Musculoskeletal: Normal range of motion and neck supple.   Cardiovascular:      Rate and Rhythm: Normal rate and regular rhythm.      Pulses: Normal pulses.      Heart sounds: Normal heart sounds.   Pulmonary:      Effort: Pulmonary effort is normal. No respiratory distress.      Breath sounds: Normal breath sounds.   Abdominal:      Palpations: Abdomen is soft.      Tenderness: There is no tenderness. There is no guarding or rebound.    Musculoskeletal: Normal range of motion.   Lymphadenopathy:      Cervical: No cervical adenopathy.   Skin:     General: Skin is warm and dry.   Neurological:      General: No focal deficit present.      Mental Status: He is alert and oriented to person, place, and time.      Cranial Nerves: No cranial nerve deficit.      Sensory: No sensory deficit.      Motor: No weakness.      Coordination: Coordination normal.      Gait: Gait normal.   Psychiatric:         Mood and Affect: Mood normal.         Behavior: Behavior normal.         Thought Content: Thought content normal.         Judgment: Judgment normal.                 Assessment/Plan:     1. Dizziness  EKG - Clinic Performed    UC AMA/Refusal of Treatment   2. Abnormal EKG       EKG: NSR at rate at 58  Minimal ST elevation seen in anterior leads with shortened IL intervals and inverted P waves.   Positive changes seen compared to old EKG done 10/02/2018.     EKG suggestive of acute ischemia.  Cannot rule out cardiac etiology of patient's symptoms in urgent care setting.  Recommend patient proceed to emergency department for further evaluation and medical treatment plan. Patient requires higher level of medical care.     I discussed with him that I recommend EMS transport at this time. However, patient is deciding against medical advice. I discussed with the patient the risks of deciding against receiving appropriate care to include death. The patient is not intoxicated. The patient is a capable decision maker and understands the risks and benefits of his decision. While I certainly disagree with the patient's decision, I respect the patient's autonomy and will not keep him here against his will. He understands that his decision to decline further care can be reversed at any time. He is involved with the discussion and also understands my concern. I have asked the patient to sign an AMA form and MA to witness this signature. The patient will be taken per private  vehicle directly to his emergency department of choice, he is in no acute distress upon departure.     Spoke with [facility] main emergency department charge nurse directly at 12:58 PM regarding patient transfer.

## 2020-01-09 NOTE — ED TRIAGE NOTES
Chief Complaint   Patient presents with   • Sent from Urgent Care     went to  for dizziness, vertigo, n/v. ekg as different from previous record and sent here for further eval needed.

## 2020-01-09 NOTE — ED PROVIDER NOTES
ED Provider Note    Scribed for Tiffany Dunn M.D. by Leslie Garcia. 2020  2:31 PM    Primary care provider: Ronda Bowling M.D.  Means of arrival: Walk in  History obtained from: Patient  History limited by: None    CHIEF COMPLAINT  Chief Complaint   Patient presents with   • Sent from Urgent Care     went to  for dizziness, vertigo, n/v. ekg as different from previous record and sent here for further eval needed.        HPI  Jimmy Hirsch is a 64 y.o. male who presents to the Emergency Department for dizziness onset 9 AM. He reports associated nausea, and chills.  He denies having any chest pains or shortness of breath.  He denies any unilateral weakness or numbness slurred speech or double vision.  He states that his symptoms are exacerbated by positional changes. The patient denies dyspnea, or leg swelling. The patient was evaluated at urgent care today and instructed to report to the ED due to an abnormal EKG. he does admit to a history of chronic sinus disease.    REVIEW OF SYSTEMS  CARDIAC: positive for chest pain    PULMONARY: no dyspnea  GI: positive for nausea   Neuro: positive for dizziness.   Musculoskeletal: no leg swelling  Endocrine: positive for chills.     See history of present illness. All other systems are negative. C.    PAST MEDICAL HISTORY   has a past medical history of High cholesterol and Unspecified disorder of thyroid.    SURGICAL HISTORY   has a past surgical history that includes wrist orif (1984); appendectomy (1978); other; condyloma ablation laser (2010); knee arthroscopy (2012); medial meniscectomy (2012); tonsillectomy (); myringotomy (); knee arthroscopy (Right, 10/11/2018); and medial meniscectomy (Right, 10/11/2018).    SOCIAL HISTORY  Social History     Tobacco Use   • Smoking status: Former Smoker     Years: 15.00     Types: Cigars     Last attempt to quit: 2012     Years since quittin.0   • Smokeless tobacco: Never Used   •  Tobacco comment: 3-4 cigars per day   Substance Use Topics   • Alcohol use: No   • Drug use: No      Social History     Substance and Sexual Activity   Drug Use No       FAMILY HISTORY  No family history on file.    CURRENT MEDICATIONS  Home Medications    **Home medications have not yet been reviewed for this encounter**         ALLERGIES  Allergies   Allergen Reactions   • Nkda [No Known Drug Allergy]        PHYSICAL EXAM  VITAL SIGNS: /75   Pulse 68   Temp 36.7 °C (98.1 °F) (Temporal)   Resp 16   Ht 1.829 m (6')   Wt 98 kg (216 lb 0.8 oz)   SpO2 97%   BMI 29.30 kg/m²     Constitutional: Well developed, Well nourished, No acute distress, Non-toxic appearance.   HEENT: Normocephalic, Atraumatic,  external ears normal, pharynx pink,  Mucous  Membranes moist, No rhinorrhea or mucosal edema  Eyes: PERRL, EOMI, Conjunctiva normal, No discharge.   Neck: Normal range of motion, No tenderness, Supple, No stridor.   Lymphatic: No lymphadenopathy    Cardiovascular: Bradycardic Rate and Normal Rhythm, No murmurs,  rubs, or gallops.   Thorax & Lungs: Lungs clear to auscultation bilaterally, No respiratory distress, No wheezes, rhales or rhonchi, No chest wall tenderness.   Abdomen: Bowel sounds normal, Soft, non tender, non distended,  No pulsatile masses., no rebound guarding or peritoneal signs.   Skin: Warm, Dry, No erythema, No rash,   Back:  No CVA tenderness,  No spinal tenderness, bony crepitance, step offs, or instability.   Neurologic: Alert & oriented x 3, Normal motor function, Normal sensory function, No focal deficits noted. Normal reflexes. Normal Cranial Nerves.  Extremities: Equal, intact distal pulses, No cyanosis, clubbing or edema,  No tenderness.   Musculoskeletal: Good range of motion in all major joints. No tenderness to palpation or major deformities noted.       DIAGNOSTIC STUDIES / PROCEDURES    LABS  Results for orders placed or performed during the hospital encounter of 01/09/20   CBC  WITH DIFFERENTIAL   Result Value Ref Range    WBC 9.5 4.8 - 10.8 K/uL    RBC 5.60 4.70 - 6.10 M/uL    Hemoglobin 16.3 14.0 - 18.0 g/dL    Hematocrit 48.0 42.0 - 52.0 %    MCV 85.7 81.4 - 97.8 fL    MCH 29.1 27.0 - 33.0 pg    MCHC 34.0 33.7 - 35.3 g/dL    RDW 39.6 35.9 - 50.0 fL    Platelet Count 415 164 - 446 K/uL    MPV 10.1 9.0 - 12.9 fL    Neutrophils-Polys 75.40 (H) 44.00 - 72.00 %    Lymphocytes 18.90 (L) 22.00 - 41.00 %    Monocytes 4.90 0.00 - 13.40 %    Eosinophils 0.10 0.00 - 6.90 %    Basophils 0.40 0.00 - 1.80 %    Immature Granulocytes 0.30 0.00 - 0.90 %    Nucleated RBC 0.00 /100 WBC    Neutrophils (Absolute) 7.13 1.82 - 7.42 K/uL    Lymphs (Absolute) 1.79 1.00 - 4.80 K/uL    Monos (Absolute) 0.46 0.00 - 0.85 K/uL    Eos (Absolute) 0.01 0.00 - 0.51 K/uL    Baso (Absolute) 0.04 0.00 - 0.12 K/uL    Immature Granulocytes (abs) 0.03 0.00 - 0.11 K/uL    NRBC (Absolute) 0.00 K/uL   COMP METABOLIC PANEL   Result Value Ref Range    Sodium 137 135 - 145 mmol/L    Potassium 3.9 3.6 - 5.5 mmol/L    Chloride 103 96 - 112 mmol/L    Co2 23 20 - 33 mmol/L    Anion Gap 11.0 0.0 - 11.9    Glucose 104 (H) 65 - 99 mg/dL    Bun 16 8 - 22 mg/dL    Creatinine 0.94 0.50 - 1.40 mg/dL    Calcium 9.7 8.5 - 10.5 mg/dL    AST(SGOT) 28 12 - 45 U/L    ALT(SGPT) 32 2 - 50 U/L    Alkaline Phosphatase 98 30 - 99 U/L    Total Bilirubin 0.7 0.1 - 1.5 mg/dL    Albumin 4.7 3.2 - 4.9 g/dL    Total Protein 7.4 6.0 - 8.2 g/dL    Globulin 2.7 1.9 - 3.5 g/dL    A-G Ratio 1.7 g/dL   LIPASE   Result Value Ref Range    Lipase 16 11 - 82 U/L   TSH   Result Value Ref Range    TSH 0.270 (L) 0.380 - 5.330 uIU/mL   TROPONIN   Result Value Ref Range    Troponin T 6 6 - 19 ng/L   FREE THYROXINE   Result Value Ref Range    Free T-4 1.29 0.53 - 1.43 ng/dL   ESTIMATED GFR   Result Value Ref Range    GFR If African American >60 >60 mL/min/1.73 m 2    GFR If Non African American >60 >60 mL/min/1.73 m 2   EKG (NOW)   Result Value Ref Range    Report        [facility] Emergency Dept.    Test Date:  2020  Pt Name:    CYDNEY UP                 Department: ER  MRN:        [id number]                      Room:  Gender:     Male                         Technician: [id number]  :        [id number]                   Requested By:ER TRIAGE PROTOCOL  Order #:    [id number]                    Reading MD: KANDIS MORGAN MD    Measurements  Intervals                                Axis  Rate:       59                           P:          267  MN:         97                           QRS:        30  QRSD:       100                          T:          66  QT:         435  QTc:        431    Interpretive Statements  Sinus or ectopic atrial bradycardia  Ventricular premature complex  Short MN interval  RSR' in V1 or V2, probably normal variant  Minimal ST elevation, anterior leads  Compared to ECG 10/02/2018 09:32:50  Bradycardia,  Ventricular premature complex(es) now present  Short MN interval now present  RSR' in V1 or V2 now pr esent  ST (T wave) deviation now present  Electronically Signed On 2020 14:20:42 PST by KANDIS MORGAN MD         All labs reviewed by me.      RADIOLOGY  CT-HEAD W/O   Final Result      No acute intracranial abnormality.      DX-CHEST-PORTABLE (1 VIEW)   Final Result      No acute cardiopulmonary abnormality.        The radiologist's interpretation of all radiological studies have been reviewed by me.    COURSE & MEDICAL DECISION MAKING  Nursing notes, VS, PMSFHx reviewed in chart.    2:31 PM Patient seen and examined at bedside. Ordered Dx-Chest, CT-Head w/o, Troponin, CBC, CMP, Lipase, TSH, Free Thyroxin, Estimated GFR, and EKG to evaluate his symptoms. The differential diagnoses include but are not limited to: vertigo, arhythmia, sinusitis.     3:40 PM - Re-examined; The patient is resting in bed. I discussed his above findings show sinusitis to the left maxillary sinus and his troponin was normal. The patient will be  discharged home with a prescription for Antivert, Zofran, and Flonase. I advised the patient to follow up with his PCP. He is understanding and agreeable to discharge.       The patient will return for new or worsening symptoms and is stable at the time of discharge.    The patient is referred to a primary physician for blood pressure management, diabetic screening, and for all other preventative health concerns.      DISPOSITION:  Patient will be discharged home in stable condition.    FOLLOW UP:  Ronda Bowling M.D.  [address]  [address]  [facility] [facility] [address]  [phone]    Call in 1 day  for recheck, As needed      OUTPATIENT MEDICATIONS:  Discharge Medication List as of 1/9/2020  4:04 PM      START taking these medications    Details   meclizine (ANTIVERT) 25 MG Tab Take 1 Tab by mouth 3 times a day as needed., Disp-30 Tab, R-0, Normal      ondansetron (ZOFRAN ODT) 4 MG TABLET DISPERSIBLE Take 1 Tab by mouth every 6 hours as needed., Disp-10 Tab, R-0, Normal      fluticasone (FLONASE) 50 MCG/ACT nasal spray Spray 2 Sprays in nose 1 time daily as needed., Disp-1 Bottle, R-1, Normal               FINAL IMPRESSION  1. Chronic maxillary sinusitis    2. Vertigo          ILeslie (Scribe), am scribing for, and in the presence of, Tiffany Dunn M.D..    Electronically signed by: Leslie Garcia (Juanibe), 1/9/2020    ITiffany M.D. personally performed the services described in this documentation, as scribed by Leslie Garcia in my presence, and it is both accurate and complete.  C  The note accurately reflects work and decisions made by me.  Tiffany Dunn  1/9/2020  9:00 PM

## 2020-02-12 ENCOUNTER — HOSPITAL ENCOUNTER (OUTPATIENT)
Dept: LAB | Facility: MEDICAL CENTER | Age: 65
End: 2020-02-12
Attending: FAMILY MEDICINE
Payer: COMMERCIAL

## 2020-02-12 LAB
ALBUMIN SERPL BCP-MCNC: 4.4 G/DL (ref 3.2–4.9)
ALBUMIN/GLOB SERPL: 1.4 G/DL
ALP SERPL-CCNC: 89 U/L (ref 30–99)
ALT SERPL-CCNC: 34 U/L (ref 2–50)
ANION GAP SERPL CALC-SCNC: 9 MMOL/L (ref 0–11.9)
APPEARANCE UR: CLEAR
AST SERPL-CCNC: 28 U/L (ref 12–45)
BASOPHILS # BLD AUTO: 0.9 % (ref 0–1.8)
BASOPHILS # BLD: 0.05 K/UL (ref 0–0.12)
BILIRUB SERPL-MCNC: 0.7 MG/DL (ref 0.1–1.5)
BILIRUB UR QL STRIP.AUTO: NEGATIVE
BUN SERPL-MCNC: 19 MG/DL (ref 8–22)
CALCIUM SERPL-MCNC: 9.5 MG/DL (ref 8.5–10.5)
CHLORIDE SERPL-SCNC: 102 MMOL/L (ref 96–112)
CHOLEST SERPL-MCNC: 200 MG/DL (ref 100–199)
CO2 SERPL-SCNC: 27 MMOL/L (ref 20–33)
COLOR UR: YELLOW
CREAT SERPL-MCNC: 1.11 MG/DL (ref 0.5–1.4)
EOSINOPHIL # BLD AUTO: 0.16 K/UL (ref 0–0.51)
EOSINOPHIL NFR BLD: 2.8 % (ref 0–6.9)
ERYTHROCYTE [DISTWIDTH] IN BLOOD BY AUTOMATED COUNT: 40.1 FL (ref 35.9–50)
GLOBULIN SER CALC-MCNC: 3.1 G/DL (ref 1.9–3.5)
GLUCOSE SERPL-MCNC: 102 MG/DL (ref 65–99)
GLUCOSE UR STRIP.AUTO-MCNC: NEGATIVE MG/DL
HCT VFR BLD AUTO: 43.7 % (ref 42–52)
HDLC SERPL-MCNC: 37 MG/DL
HGB BLD-MCNC: 15.3 G/DL (ref 14–18)
IMM GRANULOCYTES # BLD AUTO: 0.03 K/UL (ref 0–0.11)
IMM GRANULOCYTES NFR BLD AUTO: 0.5 % (ref 0–0.9)
KETONES UR STRIP.AUTO-MCNC: NEGATIVE MG/DL
LDLC SERPL CALC-MCNC: 134 MG/DL
LEUKOCYTE ESTERASE UR QL STRIP.AUTO: NEGATIVE
LYMPHOCYTES # BLD AUTO: 1.77 K/UL (ref 1–4.8)
LYMPHOCYTES NFR BLD: 30.6 % (ref 22–41)
MCH RBC QN AUTO: 30.1 PG (ref 27–33)
MCHC RBC AUTO-ENTMCNC: 35 G/DL (ref 33.7–35.3)
MCV RBC AUTO: 86 FL (ref 81.4–97.8)
MICRO URNS: NORMAL
MONOCYTES # BLD AUTO: 0.56 K/UL (ref 0–0.85)
MONOCYTES NFR BLD AUTO: 9.7 % (ref 0–13.4)
NEUTROPHILS # BLD AUTO: 3.22 K/UL (ref 1.82–7.42)
NEUTROPHILS NFR BLD: 55.5 % (ref 44–72)
NITRITE UR QL STRIP.AUTO: NEGATIVE
NRBC # BLD AUTO: 0 K/UL
NRBC BLD-RTO: 0 /100 WBC
PH UR STRIP.AUTO: 6.5 [PH] (ref 5–8)
PLATELET # BLD AUTO: 377 K/UL (ref 164–446)
PMV BLD AUTO: 10 FL (ref 9–12.9)
POTASSIUM SERPL-SCNC: 3.9 MMOL/L (ref 3.6–5.5)
PROT SERPL-MCNC: 7.5 G/DL (ref 6–8.2)
PROT UR QL STRIP: NEGATIVE MG/DL
PSA SERPL-MCNC: 5.63 NG/ML (ref 0–4)
RBC # BLD AUTO: 5.08 M/UL (ref 4.7–6.1)
RBC UR QL AUTO: NEGATIVE
SODIUM SERPL-SCNC: 138 MMOL/L (ref 135–145)
SP GR UR STRIP.AUTO: 1.02
T3FREE SERPL-MCNC: 3.67 PG/ML (ref 2.4–4.2)
T4 FREE SERPL-MCNC: 1 NG/DL (ref 0.53–1.43)
TRIGL SERPL-MCNC: 146 MG/DL (ref 0–149)
TSH SERPL DL<=0.005 MIU/L-ACNC: 0.23 UIU/ML (ref 0.38–5.33)
UROBILINOGEN UR STRIP.AUTO-MCNC: 0.2 MG/DL
WBC # BLD AUTO: 5.8 K/UL (ref 4.8–10.8)

## 2020-02-12 PROCEDURE — 84153 ASSAY OF PSA TOTAL: CPT

## 2020-02-12 PROCEDURE — 84481 FREE ASSAY (FT-3): CPT

## 2020-02-12 PROCEDURE — 80053 COMPREHEN METABOLIC PANEL: CPT

## 2020-02-12 PROCEDURE — 84439 ASSAY OF FREE THYROXINE: CPT

## 2020-02-12 PROCEDURE — 36415 COLL VENOUS BLD VENIPUNCTURE: CPT

## 2020-02-12 PROCEDURE — 85025 COMPLETE CBC W/AUTO DIFF WBC: CPT

## 2020-02-12 PROCEDURE — 83036 HEMOGLOBIN GLYCOSYLATED A1C: CPT

## 2020-02-12 PROCEDURE — 84443 ASSAY THYROID STIM HORMONE: CPT

## 2020-02-12 PROCEDURE — 80061 LIPID PANEL: CPT

## 2020-02-12 PROCEDURE — 81003 URINALYSIS AUTO W/O SCOPE: CPT

## 2020-02-13 LAB
EST. AVERAGE GLUCOSE BLD GHB EST-MCNC: 120 MG/DL
HBA1C MFR BLD: 5.8 % (ref 0–5.6)

## 2020-08-11 ENCOUNTER — HOSPITAL ENCOUNTER (OUTPATIENT)
Dept: LAB | Facility: MEDICAL CENTER | Age: 65
End: 2020-08-11
Attending: FAMILY MEDICINE
Payer: COMMERCIAL

## 2020-08-11 PROCEDURE — 84443 ASSAY THYROID STIM HORMONE: CPT

## 2020-08-11 PROCEDURE — 83036 HEMOGLOBIN GLYCOSYLATED A1C: CPT

## 2020-08-11 PROCEDURE — 84481 FREE ASSAY (FT-3): CPT

## 2020-08-11 PROCEDURE — 36415 COLL VENOUS BLD VENIPUNCTURE: CPT

## 2020-08-11 PROCEDURE — 84153 ASSAY OF PSA TOTAL: CPT

## 2020-08-11 PROCEDURE — 84439 ASSAY OF FREE THYROXINE: CPT

## 2020-08-12 LAB
EST. AVERAGE GLUCOSE BLD GHB EST-MCNC: 114 MG/DL
HBA1C MFR BLD: 5.6 % (ref 0–5.6)
PSA SERPL-MCNC: 5.36 NG/ML (ref 0–4)
T3FREE SERPL-MCNC: 2.77 PG/ML (ref 2–4.4)
T4 FREE SERPL-MCNC: 1.78 NG/DL (ref 0.93–1.7)
TSH SERPL DL<=0.005 MIU/L-ACNC: 0.07 UIU/ML (ref 0.38–5.33)

## 2020-08-17 ENCOUNTER — OFFICE VISIT (OUTPATIENT)
Dept: CARDIOLOGY | Facility: MEDICAL CENTER | Age: 65
End: 2020-08-17
Payer: COMMERCIAL

## 2020-08-17 VITALS
OXYGEN SATURATION: 95 % | WEIGHT: 196 LBS | BODY MASS INDEX: 26.55 KG/M2 | DIASTOLIC BLOOD PRESSURE: 60 MMHG | SYSTOLIC BLOOD PRESSURE: 120 MMHG | HEART RATE: 68 BPM | HEIGHT: 72 IN

## 2020-08-17 DIAGNOSIS — R01.1 HEART MURMUR: ICD-10-CM

## 2020-08-17 DIAGNOSIS — I49.3 PVC (PREMATURE VENTRICULAR CONTRACTION): ICD-10-CM

## 2020-08-17 PROCEDURE — 99204 OFFICE O/P NEW MOD 45 MIN: CPT | Performed by: INTERNAL MEDICINE

## 2020-08-17 RX ORDER — LEVOTHYROXINE SODIUM 112 UG/1
TABLET ORAL
COMMUNITY
Start: 2020-08-13

## 2020-08-17 RX ORDER — TAMSULOSIN HYDROCHLORIDE 0.4 MG/1
0.4 CAPSULE ORAL DAILY
COMMUNITY
Start: 2020-08-07

## 2020-08-17 RX ORDER — VITAMIN B COMPLEX
TABLET ORAL
COMMUNITY

## 2020-08-17 ASSESSMENT — ENCOUNTER SYMPTOMS
CLAUDICATION: 0
FEVER: 0
CHILLS: 0
SENSORY CHANGE: 0
BLURRED VISION: 0
FALLS: 0
COUGH: 0
NAUSEA: 0
SPEECH CHANGE: 0
EYE DISCHARGE: 0
HALLUCINATIONS: 0
BRUISES/BLEEDS EASILY: 0
EYE PAIN: 0
DIZZINESS: 0
LOSS OF CONSCIOUSNESS: 0
ABDOMINAL PAIN: 0
DOUBLE VISION: 0
VOMITING: 0
BLOOD IN STOOL: 0
HEADACHES: 0
WEIGHT LOSS: 0
PALPITATIONS: 0
MYALGIAS: 0
PND: 0
SHORTNESS OF BREATH: 0
DEPRESSION: 0
ORTHOPNEA: 0

## 2020-08-17 ASSESSMENT — FIBROSIS 4 INDEX: FIB4 SCORE: 0.82

## 2020-08-17 NOTE — PROGRESS NOTES
Chief Complaint   Patient presents with   • Heart Murmur       Subjective:   Jimmy Hirsch is a 65 y.o. male who presents today due to cardiac murmur.     Jimmy Hirsch does not have any history of heart attack arrhythmias in the past. He never had cardiac catheterization or ablations procedure in the past. At this time, he denies having chest pain shortness of breath presyncopal syncopal episodes. he is able to exercise with walking for one to 2 miles without having problems of chest pain or shortness of breath. Patient is also able to climb up at least 2 flights of stairs without having symptoms.    Been treated for thyroid issues as well.    I have personally interpreted her EKG today with patient, there is no evidence of acute coronary syndrome, no evidence of prior infarct, normal NE and QT interval, no significant conduction disease. + PVCs.        Past Medical History:   Diagnosis Date   • High cholesterol    • Unspecified disorder of thyroid      Past Surgical History:   Procedure Laterality Date   • KNEE ARTHROSCOPY Right 10/11/2018    Procedure: KNEE ARTHROSCOPY;  Surgeon: Dinesh Crowder M.D.;  Location: [facility];  Service: Orthopedics   • MEDIAL MENISCECTOMY Right 10/11/2018    Procedure: MEDIAL MENISCECTOMY - PARTIAL AND LUCIO partial lateral menisectomy;  Surgeon: Dinesh Crowder M.D.;  Location: [facility];  Service: Orthopedics   • KNEE ARTHROSCOPY  5/8/2012    Performed by JACQUELINE MOSER at SURGERY [facility]   • MEDIAL MENISCECTOMY  5/8/2012    Performed by JACQUELINE MOSER at SURGERY [facility]   • CONDYLOMA ABLATION LASER  2/18/2010    Performed by RAUL HINES at SURGERY [facility]   • WRIST ORIF  6/1984   • APPENDECTOMY  6/1978   • TONSILLECTOMY  1966   • MYRINGOTOMY  1966   • OTHER       History reviewed. No pertinent family history.  Social History     Socioeconomic History   • Marital status:      Spouse name: Not on file   • Number of  children: Not on file   • Years of education: Not on file   • Highest education level: Not on file   Occupational History   • Not on file   Social Needs   • Financial resource strain: Not on file   • Food insecurity     Worry: Not on file     Inability: Not on file   • Transportation needs     Medical: Not on file     Non-medical: Not on file   Tobacco Use   • Smoking status: Former Smoker     Years: 15.00     Types: Cigars     Quit date: 2012     Years since quittin.6   • Smokeless tobacco: Never Used   • Tobacco comment: 3-4 cigars per day   Substance and Sexual Activity   • Alcohol use: No   • Drug use: No   • Sexual activity: Not on file   Lifestyle   • Physical activity     Days per week: Not on file     Minutes per session: Not on file   • Stress: Not on file   Relationships   • Social connections     Talks on phone: Not on file     Gets together: Not on file     Attends [employer] service: Not on file     Active member of club or organization: Not on file     Attends meetings of clubs or organizations: Not on file     Relationship status: Not on file   • Intimate partner violence     Fear of current or ex partner: Not on file     Emotionally abused: Not on file     Physically abused: Not on file     Forced sexual activity: Not on file   Other Topics Concern   • Not on file   Social History Narrative   • Not on file     Allergies   Allergen Reactions   • Nkda [No Known Drug Allergy]      Outpatient Encounter Medications as of 2020   Medication Sig Dispense Refill   • levothyroxine (SYNTHROID) 112 MCG Tab      • tamsulosin (FLOMAX) 0.4 MG capsule Take 0.4 mg by mouth every day.     • Coenzyme Q10 (COQ10) 100 MG Cap Take  by mouth.     • meclizine (ANTIVERT) 25 MG Tab Take 1 Tab by mouth 3 times a day as needed. 30 Tab 0   • ondansetron (ZOFRAN ODT) 4 MG TABLET DISPERSIBLE Take 1 Tab by mouth every 6 hours as needed. 10 Tab 0   • fluticasone (FLONASE) 50 MCG/ACT nasal spray Spray 2 Sprays in nose 1  time daily as needed. 1 Bottle 1   • lovastatin (MEVACOR) 10 MG tablet Take 10 mg by mouth every day.     • Magnesium 400 MG CAPS Take  by mouth every day.     • levothyroxine (SYNTHROID) 125 MCG Tab Take 125 mcg by mouth Every morning on an empty stomach.     • aspirin EC (ECOTRIN) 81 MG TBEC Take 81 mg by mouth every day.     • docosahexanoic acid (OMEGA 3 FA) 1000 MG CAPS Take 1,000 mg by mouth every day.       No facility-administered encounter medications on file as of 8/17/2020.      Review of Systems   Constitutional: Negative for chills, fever, malaise/fatigue and weight loss.   HENT: Negative for ear discharge, ear pain, hearing loss and nosebleeds.    Eyes: Negative for blurred vision, double vision, pain and discharge.   Respiratory: Negative for cough and shortness of breath.    Cardiovascular: Negative for chest pain, palpitations, orthopnea, claudication, leg swelling and PND.   Gastrointestinal: Negative for abdominal pain, blood in stool, melena, nausea and vomiting.   Genitourinary: Negative for dysuria and hematuria.   Musculoskeletal: Negative for falls, joint pain and myalgias.   Skin: Negative for itching and rash.   Neurological: Negative for dizziness, sensory change, speech change, loss of consciousness and headaches.   Endo/Heme/Allergies: Negative for environmental allergies. Does not bruise/bleed easily.   Psychiatric/Behavioral: Negative for depression, hallucinations and suicidal ideas.        Objective:   /60 (BP Location: Left arm, Patient Position: Sitting)   Pulse 68   Ht 1.829 m (6')   Wt 88.9 kg (196 lb)   SpO2 95%   BMI 26.58 kg/m²     Physical Exam   Constitutional: He is oriented to person, place, and time. No distress.   HENT:   Head: Normocephalic and atraumatic.   Right Ear: External ear normal.   Left Ear: External ear normal.   Eyes: Right eye exhibits no discharge. Left eye exhibits no discharge.   Neck: No JVD present. No thyromegaly present.   Cardiovascular:  Normal rate, regular rhythm and intact distal pulses. Exam reveals no gallop and no friction rub.   Murmur heard.  2/6 systolic murmur heart best at the aortic area.   Pulmonary/Chest: Breath sounds normal. No respiratory distress.   Abdominal: Bowel sounds are normal. He exhibits no distension. There is no abdominal tenderness.   Musculoskeletal:         General: No tenderness or edema.   Neurological: He is alert and oriented to person, place, and time. No cranial nerve deficit.   Skin: Skin is warm and dry. He is not diaphoretic.   Psychiatric: He has a normal mood and affect. His behavior is normal.   Nursing note and vitals reviewed.      Assessment:     1. Heart murmur  EC-ECHOCARDIOGRAM COMPLETE W/O CONT   2. PVC (premature ventricular contraction)         Medical Decision Making:  Today's Assessment / Status / Plan:   I will order transthoracic echocardiogram to assess for structural abnormalities.  Blood pressure is well controlled.

## 2020-08-28 ENCOUNTER — HOSPITAL ENCOUNTER (OUTPATIENT)
Dept: CARDIOLOGY | Facility: MEDICAL CENTER | Age: 65
End: 2020-08-28
Attending: INTERNAL MEDICINE
Payer: COMMERCIAL

## 2020-08-28 DIAGNOSIS — R01.1 HEART MURMUR: ICD-10-CM

## 2020-08-28 PROCEDURE — 93306 TTE W/DOPPLER COMPLETE: CPT

## 2020-08-31 ENCOUNTER — TELEPHONE (OUTPATIENT)
Dept: CARDIOLOGY | Facility: MEDICAL CENTER | Age: 65
End: 2020-08-31

## 2020-08-31 LAB
LV EJECT FRACT  99904: 60
LV EJECT FRACT MOD 2C 99903: 65.87
LV EJECT FRACT MOD 4C 99902: 67.05
LV EJECT FRACT MOD BP 99901: 66.11

## 2020-08-31 PROCEDURE — 93306 TTE W/DOPPLER COMPLETE: CPT | Mod: 26 | Performed by: INTERNAL MEDICINE

## 2020-08-31 NOTE — TELEPHONE ENCOUNTER
MADELIN Elder R.N.             Dear Carmen,     Can you please let Jimmy Hirsch know that result is ok and I will see patient as scheduled?     Thank you,   Jimmy.        Called pt and informed him of results.

## 2020-08-31 NOTE — RESULT ENCOUNTER NOTE
Dear Carmen,    Can you please let Jimmy Hirsch know that result is ok and I will see patient as scheduled?    Thank you,  Jimmy.

## 2021-02-12 ENCOUNTER — APPOINTMENT (OUTPATIENT)
Dept: LAB | Facility: MEDICAL CENTER | Age: 66
End: 2021-02-12
Attending: UROLOGY
Payer: COMMERCIAL

## (undated) DEVICE — TUBING PATIENT W/CONNECTOR REDEUCE (1EA)

## (undated) DEVICE — LACTATED RINGERS INJ 1000 ML - (14EA/CA 60CA/PF)

## (undated) DEVICE — GOWN WARMING STANDARD FLEX - (30/CA)

## (undated) DEVICE — DRAPE U SPLIT IMP 54 X 76 - (24/CA)

## (undated) DEVICE — GLOVE, LITE (PAIR)

## (undated) DEVICE — NEPTUNE 4 PORT MANIFOLD - (20/PK)

## (undated) DEVICE — GLOVE BIOGEL INDICATOR SZ 7SURGICAL PF LTX - (50/BX 4BX/CA)

## (undated) DEVICE — SUTURE GENERAL

## (undated) DEVICE — CHLORAPREP 26 ML APPLICATOR - ORANGE TINT(25/CA)

## (undated) DEVICE — KIT ROOM DECONTAMINATION

## (undated) DEVICE — PROTECTOR ULNA NERVE - (36PR/CA)

## (undated) DEVICE — GLOVE BIOGEL SZ 7 SURGICAL PF LTX - (50PR/BX 4BX/CA)

## (undated) DEVICE — SENSOR SPO2 NEO LNCS ADHESIVE (20/BX) SEE USER NOTES

## (undated) DEVICE — MASK, LARYNGEAL AIRWAY #4

## (undated) DEVICE — ELECTRODE 850 FOAM ADHESIVE - HYDROGEL RADIOTRNSPRNT (50/PK)

## (undated) DEVICE — SYRINGE 30 ML LL (56/BX)

## (undated) DEVICE — NEEDLE SAFETY 18 GA X 1 1/2 IN (100EA/BX)

## (undated) DEVICE — DRAPE LOWER EXTREMETY - (6/CA)

## (undated) DEVICE — HEAD HOLDER JUNIOR/ADULT

## (undated) DEVICE — SUTURE 3-0 ETHILON FS-1 - (36/BX) 30 INCH

## (undated) DEVICE — DRAPE LARGE 3 QUARTER - (20/CA)

## (undated) DEVICE — HUMID-VENT HEAT AND MOISTURE EXCHANGE- (50/BX)

## (undated) DEVICE — MASK ANESTHESIA ADULT  - (100/CA)

## (undated) DEVICE — GLOVE BIOGEL INDICATOR SZ 6.5 SURGICAL PF LTX - (50PR/BX 4BX/CA)

## (undated) DEVICE — GLOVE BIOGEL INDICATOR SZ 8 SURGICAL PF LTX - (50/BX 4BX/CA)

## (undated) DEVICE — GLOVE SZ 7.5 LF PROTEXIS (50PR/BX)

## (undated) DEVICE — GLOVE BIOGEL SZ 6.5 SURGICAL PF LTX (50PR/BX 4BX/CA)

## (undated) DEVICE — TUBING PUMP WITH CONNECTOR REDEUCE (1EA)

## (undated) DEVICE — GLOVE BIOGEL SZ 7.5 SURGICAL PF LTX - (50PR/BX 4BX/CA)

## (undated) DEVICE — KIT ANESTHESIA W/CIRCUIT & 3/LT BAG W/FILTER (20EA/CA)

## (undated) DEVICE — SODIUM CHL. IRRIGATION 0.9% 3000ML (4EA/CA 65CA/PF)

## (undated) DEVICE — PACK KNEE ARTHROSCOPY SM OR - (2EA/CA)

## (undated) DEVICE — GLOVE BIOGEL INDICATOR SZ 7.5 SURGICAL PF LTX - (50PR/BX 4BX/CA)

## (undated) DEVICE — BLADE SHAVER AGGRESSIVE PLUS 4.0MM ANGLED (5EA/BX)